# Patient Record
Sex: FEMALE | Race: WHITE | NOT HISPANIC OR LATINO | URBAN - METROPOLITAN AREA
[De-identification: names, ages, dates, MRNs, and addresses within clinical notes are randomized per-mention and may not be internally consistent; named-entity substitution may affect disease eponyms.]

---

## 2018-09-11 ENCOUNTER — NEW PATIENT (OUTPATIENT)
Dept: URBAN - METROPOLITAN AREA CLINIC 9 | Facility: CLINIC | Age: 23
End: 2018-09-11

## 2018-09-11 DIAGNOSIS — H52.13 MYOPIA, BILATERAL: Primary | ICD-10-CM

## 2018-09-11 ASSESSMENT — INTRAOCULAR PRESSURE
OD: 14
OS: 15

## 2018-09-11 ASSESSMENT — KERATOMETRY
OD: 42.70
OS: 42.85

## 2018-09-11 ASSESSMENT — VISUAL ACUITY
OD: 20/20
OS: 20/20

## 2022-08-23 SDOH — HEALTH STABILITY: PHYSICAL HEALTH: ON AVERAGE, HOW MANY MINUTES DO YOU ENGAGE IN EXERCISE AT THIS LEVEL?: 90 MIN

## 2022-08-23 SDOH — HEALTH STABILITY: PHYSICAL HEALTH: ON AVERAGE, HOW MANY DAYS PER WEEK DO YOU ENGAGE IN MODERATE TO STRENUOUS EXERCISE (LIKE A BRISK WALK)?: 5 DAYS

## 2022-08-23 ASSESSMENT — SOCIAL DETERMINANTS OF HEALTH (SDOH)
WITHIN THE LAST YEAR, HAVE YOU BEEN HUMILIATED OR EMOTIONALLY ABUSED IN OTHER WAYS BY YOUR PARTNER OR EX-PARTNER?: YES
WITHIN THE LAST YEAR, HAVE TO BEEN RAPED OR FORCED TO HAVE ANY KIND OF SEXUAL ACTIVITY BY YOUR PARTNER OR EX-PARTNER?: NO
WITHIN THE LAST YEAR, HAVE YOU BEEN KICKED, HIT, SLAPPED, OR OTHERWISE PHYSICALLY HURT BY YOUR PARTNER OR EX-PARTNER?: NO
WITHIN THE LAST YEAR, HAVE YOU BEEN AFRAID OF YOUR PARTNER OR EX-PARTNER?: NO

## 2022-08-24 ENCOUNTER — OFFICE VISIT (OUTPATIENT)
Dept: INTERNAL MEDICINE CLINIC | Age: 27
End: 2022-08-24
Payer: COMMERCIAL

## 2022-08-24 VITALS
RESPIRATION RATE: 14 BRPM | SYSTOLIC BLOOD PRESSURE: 110 MMHG | DIASTOLIC BLOOD PRESSURE: 78 MMHG | BODY MASS INDEX: 19.96 KG/M2 | HEART RATE: 102 BPM | HEIGHT: 66 IN | WEIGHT: 124.2 LBS | OXYGEN SATURATION: 100 %

## 2022-08-24 DIAGNOSIS — F90.9 ATTENTION DEFICIT HYPERACTIVITY DISORDER (ADHD), UNSPECIFIED ADHD TYPE: Primary | ICD-10-CM

## 2022-08-24 DIAGNOSIS — F41.9 ANXIETY: ICD-10-CM

## 2022-08-24 PROCEDURE — 99204 OFFICE O/P NEW MOD 45 MIN: CPT | Performed by: INTERNAL MEDICINE

## 2022-08-24 RX ORDER — NORETHINDRONE ACETATE AND ETHINYL ESTRADIOL AND FERROUS FUMARATE 1MG-20(21)
KIT ORAL
COMMUNITY
Start: 2022-07-06

## 2022-08-24 RX ORDER — DEXTROAMPHETAMINE SACCHARATE, AMPHETAMINE ASPARTATE MONOHYDRATE, DEXTROAMPHETAMINE SULFATE AND AMPHETAMINE SULFATE 7.5; 7.5; 7.5; 7.5 MG/1; MG/1; MG/1; MG/1
30 CAPSULE, EXTENDED RELEASE ORAL DAILY
COMMUNITY
Start: 2022-08-15 | End: 2022-10-21 | Stop reason: SDUPTHER

## 2022-08-24 ASSESSMENT — PATIENT HEALTH QUESTIONNAIRE - PHQ9
SUM OF ALL RESPONSES TO PHQ QUESTIONS 1-9: 0
SUM OF ALL RESPONSES TO PHQ QUESTIONS 1-9: 0
2. FEELING DOWN, DEPRESSED OR HOPELESS: 0
1. LITTLE INTEREST OR PLEASURE IN DOING THINGS: 0
SUM OF ALL RESPONSES TO PHQ QUESTIONS 1-9: 0
SUM OF ALL RESPONSES TO PHQ QUESTIONS 1-9: 0
SUM OF ALL RESPONSES TO PHQ9 QUESTIONS 1 & 2: 0
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
## 2022-08-25 ENCOUNTER — TELEPHONE (OUTPATIENT)
Dept: INTERNAL MEDICINE CLINIC | Age: 27
End: 2022-08-25

## 2022-08-25 NOTE — TELEPHONE ENCOUNTER
Patient currently sees a psychiatrist here locally who wants her to have ADHD testing next month. She is not really happy with this psychiatrist.  Would Dr. Francesco Gipson be willing  to meet with her to determine if they have a good rapport so she can forego having this testing done? Would he require her to have this  ADHD testing done? Please call  patient at number provided to discuss. Also patient has questions about recent test results that she would like to discuss as well with an MA/Dr. Ela Edwards. Recommended observation.

## 2022-08-25 NOTE — TELEPHONE ENCOUNTER
Please let patient know that all of her labs came back normal. It will be up to Dr. Nora Garcia as to whether or not he wants her to have formal ADHD testing. I do think she would have a good experience with Dr. Nora Garcia regardless.
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
## 2022-09-21 ENCOUNTER — OFFICE VISIT (OUTPATIENT)
Dept: PSYCHIATRY | Age: 27
End: 2022-09-21
Payer: COMMERCIAL

## 2022-09-21 VITALS
OXYGEN SATURATION: 96 % | SYSTOLIC BLOOD PRESSURE: 106 MMHG | BODY MASS INDEX: 19.85 KG/M2 | HEART RATE: 124 BPM | WEIGHT: 123 LBS | DIASTOLIC BLOOD PRESSURE: 62 MMHG

## 2022-09-21 DIAGNOSIS — F90.0 ADHD, PREDOMINANTLY INATTENTIVE TYPE: Primary | ICD-10-CM

## 2022-09-21 DIAGNOSIS — F51.05 INSOMNIA DUE TO MENTAL CONDITION: ICD-10-CM

## 2022-09-21 PROCEDURE — 99204 OFFICE O/P NEW MOD 45 MIN: CPT | Performed by: STUDENT IN AN ORGANIZED HEALTH CARE EDUCATION/TRAINING PROGRAM

## 2022-09-21 RX ORDER — HYDROXYZINE HYDROCHLORIDE 25 MG/1
25 TABLET, FILM COATED ORAL NIGHTLY PRN
Qty: 30 TABLET | Refills: 0 | Status: SHIPPED | OUTPATIENT
Start: 2022-09-21 | End: 2022-10-21

## 2022-09-21 ASSESSMENT — PATIENT HEALTH QUESTIONNAIRE - PHQ9
8. MOVING OR SPEAKING SO SLOWLY THAT OTHER PEOPLE COULD HAVE NOTICED. OR THE OPPOSITE, BEING SO FIGETY OR RESTLESS THAT YOU HAVE BEEN MOVING AROUND A LOT MORE THAN USUAL: 1
4. FEELING TIRED OR HAVING LITTLE ENERGY: 2
1. LITTLE INTEREST OR PLEASURE IN DOING THINGS: 0
6. FEELING BAD ABOUT YOURSELF - OR THAT YOU ARE A FAILURE OR HAVE LET YOURSELF OR YOUR FAMILY DOWN: 0
SUM OF ALL RESPONSES TO PHQ9 QUESTIONS 1 & 2: 0
SUM OF ALL RESPONSES TO PHQ QUESTIONS 1-9: 7
SUM OF ALL RESPONSES TO PHQ QUESTIONS 1-9: 7
10. IF YOU CHECKED OFF ANY PROBLEMS, HOW DIFFICULT HAVE THESE PROBLEMS MADE IT FOR YOU TO DO YOUR WORK, TAKE CARE OF THINGS AT HOME, OR GET ALONG WITH OTHER PEOPLE: 2
7. TROUBLE CONCENTRATING ON THINGS, SUCH AS READING THE NEWSPAPER OR WATCHING TELEVISION: 1
SUM OF ALL RESPONSES TO PHQ QUESTIONS 1-9: 7
3. TROUBLE FALLING OR STAYING ASLEEP: 3
2. FEELING DOWN, DEPRESSED OR HOPELESS: 0
SUM OF ALL RESPONSES TO PHQ QUESTIONS 1-9: 7
5. POOR APPETITE OR OVEREATING: 0
9. THOUGHTS THAT YOU WOULD BE BETTER OFF DEAD, OR OF HURTING YOURSELF: 0

## 2022-09-21 ASSESSMENT — ANXIETY QUESTIONNAIRES
IF YOU CHECKED OFF ANY PROBLEMS ON THIS QUESTIONNAIRE, HOW DIFFICULT HAVE THESE PROBLEMS MADE IT FOR YOU TO DO YOUR WORK, TAKE CARE OF THINGS AT HOME, OR GET ALONG WITH OTHER PEOPLE: SOMEWHAT DIFFICULT
6. BECOMING EASILY ANNOYED OR IRRITABLE: 1
4. TROUBLE RELAXING: 2
7. FEELING AFRAID AS IF SOMETHING AWFUL MIGHT HAPPEN: 0
1. FEELING NERVOUS, ANXIOUS, OR ON EDGE: 2
5. BEING SO RESTLESS THAT IT IS HARD TO SIT STILL: 1
3. WORRYING TOO MUCH ABOUT DIFFERENT THINGS: 2
2. NOT BEING ABLE TO STOP OR CONTROL WORRYING: 2
GAD7 TOTAL SCORE: 10

## 2022-10-18 ENCOUNTER — OFFICE VISIT (OUTPATIENT)
Dept: PSYCHOLOGY | Age: 27
End: 2022-10-18

## 2022-10-18 DIAGNOSIS — Z62.820 RELATIONSHIP PROBLEM WITH PARENT: ICD-10-CM

## 2022-10-18 DIAGNOSIS — F41.9 ANXIETY: Primary | ICD-10-CM

## 2022-10-18 PROCEDURE — 90791 PSYCH DIAGNOSTIC EVALUATION: CPT

## 2022-10-18 ASSESSMENT — ANXIETY QUESTIONNAIRES
6. BECOMING EASILY ANNOYED OR IRRITABLE: 0
GAD7 TOTAL SCORE: 5
5. BEING SO RESTLESS THAT IT IS HARD TO SIT STILL: 1
4. TROUBLE RELAXING: 1
IF YOU CHECKED OFF ANY PROBLEMS ON THIS QUESTIONNAIRE, HOW DIFFICULT HAVE THESE PROBLEMS MADE IT FOR YOU TO DO YOUR WORK, TAKE CARE OF THINGS AT HOME, OR GET ALONG WITH OTHER PEOPLE: SOMEWHAT DIFFICULT
7. FEELING AFRAID AS IF SOMETHING AWFUL MIGHT HAPPEN: 0
1. FEELING NERVOUS, ANXIOUS, OR ON EDGE: 1
2. NOT BEING ABLE TO STOP OR CONTROL WORRYING: 1
3. WORRYING TOO MUCH ABOUT DIFFERENT THINGS: 1

## 2022-10-18 ASSESSMENT — PATIENT HEALTH QUESTIONNAIRE - PHQ9
SUM OF ALL RESPONSES TO PHQ QUESTIONS 1-9: 4
3. TROUBLE FALLING OR STAYING ASLEEP: 1
8. MOVING OR SPEAKING SO SLOWLY THAT OTHER PEOPLE COULD HAVE NOTICED. OR THE OPPOSITE, BEING SO FIGETY OR RESTLESS THAT YOU HAVE BEEN MOVING AROUND A LOT MORE THAN USUAL: 1
6. FEELING BAD ABOUT YOURSELF - OR THAT YOU ARE A FAILURE OR HAVE LET YOURSELF OR YOUR FAMILY DOWN: 0
5. POOR APPETITE OR OVEREATING: 0
SUM OF ALL RESPONSES TO PHQ QUESTIONS 1-9: 4
1. LITTLE INTEREST OR PLEASURE IN DOING THINGS: 0
4. FEELING TIRED OR HAVING LITTLE ENERGY: 1
SUM OF ALL RESPONSES TO PHQ QUESTIONS 1-9: 4
10. IF YOU CHECKED OFF ANY PROBLEMS, HOW DIFFICULT HAVE THESE PROBLEMS MADE IT FOR YOU TO DO YOUR WORK, TAKE CARE OF THINGS AT HOME, OR GET ALONG WITH OTHER PEOPLE: 1
7. TROUBLE CONCENTRATING ON THINGS, SUCH AS READING THE NEWSPAPER OR WATCHING TELEVISION: 1
9. THOUGHTS THAT YOU WOULD BE BETTER OFF DEAD, OR OF HURTING YOURSELF: 0
SUM OF ALL RESPONSES TO PHQ QUESTIONS 1-9: 4
2. FEELING DOWN, DEPRESSED OR HOPELESS: 0
SUM OF ALL RESPONSES TO PHQ9 QUESTIONS 1 & 2: 0
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
## 2022-10-23 ASSESSMENT — ENCOUNTER SYMPTOMS
EYES NEGATIVE: 1
GASTROINTESTINAL NEGATIVE: 1
RESPIRATORY NEGATIVE: 1
ALLERGIC/IMMUNOLOGIC NEGATIVE: 1

## 2022-10-24 ENCOUNTER — OFFICE VISIT (OUTPATIENT)
Dept: PSYCHIATRY | Age: 27
End: 2022-10-24
Payer: COMMERCIAL

## 2022-10-24 VITALS
HEART RATE: 112 BPM | WEIGHT: 124.4 LBS | DIASTOLIC BLOOD PRESSURE: 70 MMHG | BODY MASS INDEX: 20.08 KG/M2 | OXYGEN SATURATION: 97 % | SYSTOLIC BLOOD PRESSURE: 110 MMHG

## 2022-10-24 DIAGNOSIS — F33.8 SEASONAL AFFECTIVE DISORDER (HCC): ICD-10-CM

## 2022-10-24 DIAGNOSIS — F90.0 ADHD, PREDOMINANTLY INATTENTIVE TYPE: Primary | ICD-10-CM

## 2022-10-24 PROCEDURE — 99214 OFFICE O/P EST MOD 30 MIN: CPT | Performed by: STUDENT IN AN ORGANIZED HEALTH CARE EDUCATION/TRAINING PROGRAM

## 2022-10-24 RX ORDER — BUPROPION HYDROCHLORIDE 150 MG/1
150 TABLET ORAL EVERY MORNING
Qty: 30 TABLET | Refills: 2 | Status: SHIPPED | OUTPATIENT
Start: 2022-10-24 | End: 2022-11-28 | Stop reason: SINTOL

## 2022-10-24 ASSESSMENT — PATIENT HEALTH QUESTIONNAIRE - PHQ9
SUM OF ALL RESPONSES TO PHQ QUESTIONS 1-9: 3
4. FEELING TIRED OR HAVING LITTLE ENERGY: 1
SUM OF ALL RESPONSES TO PHQ QUESTIONS 1-9: 3
10. IF YOU CHECKED OFF ANY PROBLEMS, HOW DIFFICULT HAVE THESE PROBLEMS MADE IT FOR YOU TO DO YOUR WORK, TAKE CARE OF THINGS AT HOME, OR GET ALONG WITH OTHER PEOPLE: 1
9. THOUGHTS THAT YOU WOULD BE BETTER OFF DEAD, OR OF HURTING YOURSELF: 0
7. TROUBLE CONCENTRATING ON THINGS, SUCH AS READING THE NEWSPAPER OR WATCHING TELEVISION: 1
6. FEELING BAD ABOUT YOURSELF - OR THAT YOU ARE A FAILURE OR HAVE LET YOURSELF OR YOUR FAMILY DOWN: 0
1. LITTLE INTEREST OR PLEASURE IN DOING THINGS: 0
5. POOR APPETITE OR OVEREATING: 0
8. MOVING OR SPEAKING SO SLOWLY THAT OTHER PEOPLE COULD HAVE NOTICED. OR THE OPPOSITE, BEING SO FIGETY OR RESTLESS THAT YOU HAVE BEEN MOVING AROUND A LOT MORE THAN USUAL: 1
2. FEELING DOWN, DEPRESSED OR HOPELESS: 0
3. TROUBLE FALLING OR STAYING ASLEEP: 0
SUM OF ALL RESPONSES TO PHQ9 QUESTIONS 1 & 2: 0

## 2022-10-24 ASSESSMENT — ENCOUNTER SYMPTOMS
EYES NEGATIVE: 1
RESPIRATORY NEGATIVE: 1
GASTROINTESTINAL NEGATIVE: 1
ALLERGIC/IMMUNOLOGIC NEGATIVE: 1

## 2022-10-24 ASSESSMENT — ANXIETY QUESTIONNAIRES
3. WORRYING TOO MUCH ABOUT DIFFERENT THINGS: 1
7. FEELING AFRAID AS IF SOMETHING AWFUL MIGHT HAPPEN: 0
5. BEING SO RESTLESS THAT IT IS HARD TO SIT STILL: 0
GAD7 TOTAL SCORE: 4
6. BECOMING EASILY ANNOYED OR IRRITABLE: 0
IF YOU CHECKED OFF ANY PROBLEMS ON THIS QUESTIONNAIRE, HOW DIFFICULT HAVE THESE PROBLEMS MADE IT FOR YOU TO DO YOUR WORK, TAKE CARE OF THINGS AT HOME, OR GET ALONG WITH OTHER PEOPLE: SOMEWHAT DIFFICULT
2. NOT BEING ABLE TO STOP OR CONTROL WORRYING: 1
1. FEELING NERVOUS, ANXIOUS, OR ON EDGE: 1
4. TROUBLE RELAXING: 1

## 2022-10-24 NOTE — PROGRESS NOTES
PSYCHIATRY PROGRESS NOTE    Devaughn Lantigua  1995  10/24/22  Face to Face time: 30 minutes  PCP: Richmond Lainez DO    CC:   Chief Complaint   Patient presents with    Follow-up    ADHD     Patient is a 44-year-old female without significant past medical history presents the outpatient psychiatric clinic today for evaluation management of depression and anxiety as well as ADHD concerns. A:  Patient's presentation today is indicative of continued success of her ADHD medications, though there is some concern for focus difficulties as well as other mood changes that have said and now that the weather has begun to change. This likely does lead to a diagnosis of a seasonal affective disorder, which would be congruent with the patient's stated history from the difference between when they were in [place] versus  [place]. We will plan to adjust the patient's medication regimen in order to provide her with improved support. Diagnosis:  ADHD, inattentive subtype predominant  Seasonal affective disorder    P:   1. We will plan to continue the patient's medication of Adderall XR 30 mg daily for treatment of ADHD. 2.  We will plan to add bupropion  mg daily for treatment of seasonal affect of disorder as well as to provide ADHD support. Patient was advised on potential side effects of this medication including headaches, increased blood pressure/heart rate, insomnia, appetite suppression, and increased suicidal ideations. Medication Monitoring:    - PDMP reviewed: Adderall XR 30 mg daily 30-day supply filled 9/21/2022. Follow-up: 4 weeks    Safety: Pt was counseled on the potential for increased suicidal ideations and advised on potential options for dealing with these including hotlines, calling the office, or going to the nearest emergency room.       __________________________________________________________________________    S:   Patient reported that she was doing \"okay\", finding that her work is slowed up a little bit and that that was helpful. That being said, the patient has noticed that as the weather has begun to change that she is experiencing a decrease in her moods that have also included a change in her ability to focus. The patient noted that she has difficulties bringing tasks to conclusion, finding that her to do is keeps getting longer and she has difficulties actually finishing things off of the list.  She noted that it was harder for her to get excited about the holidays this year because her family is more scattered and she is not in a place where she has a lot of social support. That said, she is still functional, getting up and going to work and trying to engage in work-related social activities. She does recognize that she could use a little bit of help in function. Patient denied any SI/HI/AVH on evaluation today. ROS:  Review of Systems   Constitutional: Negative. HENT: Negative. Eyes: Negative. Respiratory: Negative. Cardiovascular: Negative. Gastrointestinal: Negative. Endocrine: Negative. Genitourinary: Negative. Musculoskeletal: Negative. Skin: Negative. Allergic/Immunologic: Negative. Neurological: Negative. Hematological: Negative. Psychiatric/Behavioral:  Positive for decreased concentration. Brief Medical Hx:   Patient Active Problem List   Diagnosis    ADHD, predominantly inattentive type        Brief Psych Hx:  Hosp: Denied  Diagnoses: ADHD  Med trials: No prior medications other than Adderall  Outpt: Has had previous psychiatrist but not currently. Has been interested in counseling previously but is not currently engaged in counseling.   NSSI: Denied  Suicide Attempts: Denied    O:  Wt Readings from Last 3 Encounters:   10/24/22 124 lb 6.4 oz (56.4 kg)   09/21/22 123 lb (55.8 kg)   08/24/22 124 lb 3.2 oz (56.3 kg)       Vitals:    10/24/22 0835   BP: 110/70   Pulse: (!) 112   SpO2: 97%   Weight: 124 lb 6.4 oz (56.4 kg)       Mental Status Exam:   Appearance:    Appropriately dressed  Motor: No abnormal movements, tics or mannerisms. Eye Contact: Good  Speech:    Appropriate rate and rhythm, maybe slightly slower compared to previous  Language:   Appropriate diction  Mood/Affect:  \" I can feel it slowing down a little\"/some mild blunting apparent but generally full range of affect  Thought Process:   Linear, logical  Thought Content:    Some depressive content more present, no SI/HI  Hallucinations:   Denied, not seem to be responding to internal stimuli  Associations:   Intact  Attention/Concentration:   Intact  Orientation:    Alert and oriented x4  Memory:   Intact  Fund of Knowledge:    Appropriate for age and education  Insight/Judgement:   Intact/intact      JESUS ALBERTO-7 SCREENING 10/24/2022 10/18/2022   Feeling nervous, anxious, or on edge Several days Several days   Not being able to stop or control worrying Several days Several days   Worrying too much about different things Several days Several days   Trouble relaxing Several days Several days   Being so restless that it is hard to sit still Not at all Several days   Becoming easily annoyed or irritable Not at all Not at all   Feeling afraid as if something awful might happen Not at all Not at all   JESUS ALBERTO-7 Total Score 4 5   How difficult have these problems made it for you to do your work, take care of things at home, or get along with other people?  Somewhat difficult Somewhat difficult     PHQ-9 Questionaire 10/24/2022 10/18/2022   Little interest or pleasure in doing things 0 0   Feeling down, depressed, or hopeless 0 0   Trouble falling or staying asleep, or sleeping too much 0 1   Feeling tired or having little energy 1 1   Poor appetite or overeating 0 0   Feeling bad about yourself - or that you are a failure or have let yourself or your family down 0 0   Trouble concentrating on things, such as reading the newspaper or watching television 1 1   Moving or speaking so slowly that other people could have noticed. Or the opposite - being so fidgety or restless that you have been moving around a lot more than usual 1 1   Thoughts that you would be better off dead, or of hurting yourself in some way 0 0   PHQ-9 Total Score 3 4   If you checked off any problems, how difficult have these problems made it for you to do your work, take care of things at home, or get along with other people? 1 1      Labs:     Orders Only on 08/24/2022   Component Date Value Ref Range Status    TSH 08/24/2022 4.15  0.27 - 4.20 uIU/mL Final    Sodium 08/24/2022 138  136 - 145 mmol/L Final    Potassium 08/24/2022 3.8  3.5 - 5.1 mmol/L Final    Chloride 08/24/2022 102  99 - 110 mmol/L Final    CO2 08/24/2022 25  21 - 32 mmol/L Final    Anion Gap 08/24/2022 11  3 - 16 Final    Glucose 08/24/2022 86  70 - 99 mg/dL Final    BUN 08/24/2022 12  7 - 20 mg/dL Final    Creatinine 08/24/2022 1.0  0.6 - 1.1 mg/dL Final    GFR Non- 08/24/2022 >60  >60 Final    Comment: >60 mL/min/1.73m2 EGFR, calc. for ages 25 and older using the  MDRD formula (not corrected for weight), is valid for stable  renal function. GFR  08/24/2022 >60  >60 Final    Comment: Chronic Kidney Disease: less than 60 ml/min/1.73 sq.m. Kidney Failure: less than 15 ml/min/1.73 sq.m. Results valid for patients 18 years and older.       Calcium 08/24/2022 9.8  8.3 - 10.6 mg/dL Final    Total Protein 08/24/2022 7.0  6.4 - 8.2 g/dL Final    Albumin 08/24/2022 4.6  3.4 - 5.0 g/dL Final    Albumin/Globulin Ratio 08/24/2022 1.9  1.1 - 2.2 Final    Total Bilirubin 08/24/2022 0.4  0.0 - 1.0 mg/dL Final    Alkaline Phosphatase 08/24/2022 58  40 - 129 U/L Final    ALT 08/24/2022 16  10 - 40 U/L Final    AST 08/24/2022 17  15 - 37 U/L Final       EKG: None on file        Maame Cordova MD  Psychiatrist

## 2022-11-08 ENCOUNTER — OFFICE VISIT (OUTPATIENT)
Dept: PSYCHOLOGY | Age: 27
End: 2022-11-08
Payer: COMMERCIAL

## 2022-11-08 DIAGNOSIS — F41.9 ANXIETY: Primary | ICD-10-CM

## 2022-11-08 DIAGNOSIS — F33.8 SEASONAL AFFECTIVE DISORDER (HCC): ICD-10-CM

## 2022-11-08 PROCEDURE — 90832 PSYTX W PT 30 MINUTES: CPT

## 2022-11-08 ASSESSMENT — PATIENT HEALTH QUESTIONNAIRE - PHQ9
7. TROUBLE CONCENTRATING ON THINGS, SUCH AS READING THE NEWSPAPER OR WATCHING TELEVISION: 0
SUM OF ALL RESPONSES TO PHQ QUESTIONS 1-9: 6
9. THOUGHTS THAT YOU WOULD BE BETTER OFF DEAD, OR OF HURTING YOURSELF: 0
4. FEELING TIRED OR HAVING LITTLE ENERGY: 2
5. POOR APPETITE OR OVEREATING: 0
SUM OF ALL RESPONSES TO PHQ QUESTIONS 1-9: 6
1. LITTLE INTEREST OR PLEASURE IN DOING THINGS: 1
SUM OF ALL RESPONSES TO PHQ9 QUESTIONS 1 & 2: 2
SUM OF ALL RESPONSES TO PHQ QUESTIONS 1-9: 6
6. FEELING BAD ABOUT YOURSELF - OR THAT YOU ARE A FAILURE OR HAVE LET YOURSELF OR YOUR FAMILY DOWN: 0
SUM OF ALL RESPONSES TO PHQ QUESTIONS 1-9: 6
2. FEELING DOWN, DEPRESSED OR HOPELESS: 1
10. IF YOU CHECKED OFF ANY PROBLEMS, HOW DIFFICULT HAVE THESE PROBLEMS MADE IT FOR YOU TO DO YOUR WORK, TAKE CARE OF THINGS AT HOME, OR GET ALONG WITH OTHER PEOPLE: 1
3. TROUBLE FALLING OR STAYING ASLEEP: 2
8. MOVING OR SPEAKING SO SLOWLY THAT OTHER PEOPLE COULD HAVE NOTICED. OR THE OPPOSITE, BEING SO FIGETY OR RESTLESS THAT YOU HAVE BEEN MOVING AROUND A LOT MORE THAN USUAL: 0

## 2022-11-08 ASSESSMENT — ANXIETY QUESTIONNAIRES
4. TROUBLE RELAXING: 1
3. WORRYING TOO MUCH ABOUT DIFFERENT THINGS: 1
2. NOT BEING ABLE TO STOP OR CONTROL WORRYING: 1
IF YOU CHECKED OFF ANY PROBLEMS ON THIS QUESTIONNAIRE, HOW DIFFICULT HAVE THESE PROBLEMS MADE IT FOR YOU TO DO YOUR WORK, TAKE CARE OF THINGS AT HOME, OR GET ALONG WITH OTHER PEOPLE: SOMEWHAT DIFFICULT
GAD7 TOTAL SCORE: 4
7. FEELING AFRAID AS IF SOMETHING AWFUL MIGHT HAPPEN: 0
6. BECOMING EASILY ANNOYED OR IRRITABLE: 0
5. BEING SO RESTLESS THAT IT IS HARD TO SIT STILL: 0
1. FEELING NERVOUS, ANXIOUS, OR ON EDGE: 1
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
## 2022-11-09 NOTE — PATIENT INSTRUCTIONS
Return to see Dr. Nicho Solomon in 3-4 weeks. Begin setting schedule for behavioral activation in preparation for seasonal changes. Leisure Activities Catalogue: The following is a list of activities that might be fun and pleasurable for you. Feel free to add your own fun activities to the list.    1.  Soaking in the bathtub  2. Planning my career  3. Collecting things (coins, shells, etc.)  4. Going on a vacation  5. Recycling old items  6. Relaxing  7. Going on a date  6. Going to a movie  9.  Jogging, walking  10. Listening to music  11. Thinking I have done a full day's work  15. Recalling past parties  15. Buying household gadgets  14. Lying in the sun  15. Planning a career change  16. Laughing  17. Thinking about my past trips  18. Listening to others  23. Reading magazines or newspapers  20. Hobbies (stamp collecting, model building, etc.)  21. Spending an evening with good friends  25. Planning a day's activities  21. Meeting new people  24. Remembering beautiful scenery   22. Saving money  26. Card and board games  27. Going to the gym, doing aerobics  28. Eating  29. Thinking how it will be when I finish school  30. Getting out of debt/paying debts  31. Practicing karate, judo, yoga  32. Thinking about [facility]  35. Playing with Legos  34. Working on my car (bicycle)  35. Remembering the words and deeds of loving people  39. Wearing sexy clothes  37. Having quiet evenings  38. Taking care of my plants  39. Buying, selling stocks and shares  40. Going swimming  41. Doodling, coloring  42. Exercising  43. Collecting old things  40. Going to a party  45. Thinking about buying things  46. Playing golf  47. Playing soccer  48. Flying kites  49. Having discussions with friends  48. Having family get-togethers  46. Riding a motor bike or motorcycle  52. Sex  48. Playing or learning a musical instrument  54. Going camping  55. Singing around the house  64. Arranging flowers  57. Going to Religious, praying (practicing Jainism)  62. Losing weight   59. Going to the beach  60. Thinking I am an OK person  64. A day with nothing to do  62. Having class reunions  61. Going ice skating, roller skating  64. Going sailing  65. Traveling abroad, interstate, or within the state  77. Sketching, painting  79. Doing something spontaneously  68. Doing embroidery, cross stitching  69. Sleeping  70. Driving  71. Entertaining  72. Going to clubs (garden, selling, etc.)  68. Thinking about getting   76. Going bird watching  75. Singing with groups  76. Flirting  77. Playing musical instruments  78. Doing arts and crafts  78. Making a gift for someone  [de-identified]. Buying CDs, tapes, records  81. Watching boxing, wrestling  82. Planning parties  80. Cooking, baking  84. Going hiking, walking  85. Writing books (pollens, articles)  80. Sewing  87. Buying close  88. Working  89. Going out to dinner  90. Discussing box  91. Sight seeing  80. Gardening  93. Getting a manicure/pedicure  94. Early morning coffee and newspaper  95. Playing tennis  96. Kissing  97. Watching my children play  98. Going to plays and concerts  99. Daydreaming  100. Planning to go to school  101. Thinking about sex  80. Going for a drive  372. Listening to the stereo  104. Refurbishing furniture  105. Watching TV, videos  106. Making lists of tasks  107. Going bike riding  108. Walks on the river[place]  109. Buying gifts  110. Traveling to [place]  111. Completing a task  112. Thinking about my achievements  113. Going to a sports game  114. Eating delicious food  6:99. Exchanging emails, chatting on the Internet  116. Photography  117. Going fishing  118. Thinking about pleasant events  119. Staying on a diet  120. Start gazing  121. Flying a plane  122. Reading fiction  123. Acting  124. Being alone  125.   Writing a diary, journal entry or letter  126. Cleaning  127. Reading non-fiction  128. Taking children places  129. Dancing  1:30. Going on a picnic  131. Thinking \"I did that pretty well\" after doing something  132. Meditating  133. Playing volleyball  134. Having lunch with a friend  135. Going to the hills  136. Thinking about having a family  80. Thoughts about happy moments in my childhood  138. Splurging  139. Playing cards  140. Solving riddles mentally  141. Having a political discussion  142. Exploring a new area of town  143. Seeing and/or showing photos or slides  144. Knitting/crocheting/quilting  145. Doing crossword puzzles  146. Shooting pool / playing billiards  147. Dressing up and looking nice  148. Reflecting on how I've improved  149. Buying things for myself  150. Talking on the phone  151. Going to museums, [employer]  152. Thinking Restorationist thoughts  153. Surfing the Internet  154. Lighting candles  155. Listening to the radio  156. Watching DEVON Talks  157. Having coffee at a café  158. Listening to the radio  159. Getting/giving a massage  160. Saying \"I love you\"  161. Thinking about my good qualities  162. Buying books  163. Taking a sauna or steam bath  164. Going skiing  165. Going canoeing or white-water rafting  166. Going bowling  167. Doing woodworking  168. Fantasizing about the future  169. Doing ballet, jazz, tap dancing  170. Debating  171. Playing computer games  172. Having an aquarium  173. Erotica (sex books, movies)  80. Going horseback riding  175. Going rockclimbing  176. Thinking about becoming active in the community  177. Doing something new  178. Making jigsaw puzzles  179. Thinking I'm a person who can cope  180. Playing with my pets  181. Having a barbecue  182. Rearranging the furniture in my house  183. Buying new furniture  184. Going windowshopping  185. Thinking I have a lot more going for me than most people  186.   Gardening

## 2022-11-21 ENCOUNTER — OFFICE VISIT (OUTPATIENT)
Dept: INTERNAL MEDICINE CLINIC | Age: 27
End: 2022-11-21
Payer: COMMERCIAL

## 2022-11-21 ENCOUNTER — TELEPHONE (OUTPATIENT)
Dept: PSYCHIATRY | Age: 27
End: 2022-11-21

## 2022-11-21 VITALS
OXYGEN SATURATION: 96 % | SYSTOLIC BLOOD PRESSURE: 102 MMHG | WEIGHT: 126.2 LBS | HEIGHT: 66 IN | HEART RATE: 97 BPM | BODY MASS INDEX: 20.28 KG/M2 | DIASTOLIC BLOOD PRESSURE: 58 MMHG

## 2022-11-21 DIAGNOSIS — Z00.00 ANNUAL PHYSICAL EXAM: Primary | ICD-10-CM

## 2022-11-21 PROCEDURE — 99395 PREV VISIT EST AGE 18-39: CPT | Performed by: INTERNAL MEDICINE

## 2022-11-21 RX ORDER — NORETHINDRONE ACETATE AND ETHINYL ESTRADIOL AND FERROUS FUMARATE 1MG-20(21)
1 KIT ORAL DAILY
Qty: 3 PACKET | Refills: 2 | Status: SHIPPED | OUTPATIENT
Start: 2022-11-21

## 2022-11-21 ASSESSMENT — PATIENT HEALTH QUESTIONNAIRE - PHQ9
SUM OF ALL RESPONSES TO PHQ9 QUESTIONS 1 & 2: 0
2. FEELING DOWN, DEPRESSED OR HOPELESS: 0
SUM OF ALL RESPONSES TO PHQ QUESTIONS 1-9: 0
1. LITTLE INTEREST OR PLEASURE IN DOING THINGS: 0

## 2022-11-21 NOTE — PROGRESS NOTES
James Chase (:  1995) is a 32 y.o. female,Established patient, here for evaluation of the following chief complaint(s): Annual Exam      ASSESSMENT/PLAN:  1. Annual physical exam  Seat belt use:yes  Smoke and carbon monoxide detectors within the home: yes  Jake Star screen use: no, education given. Diet: feels overall she does well. Veggies and fruits daily. Protein eats beef, chicken, seafood. Fried foods about 2-3 x per week. Sugary drinks 2 x per week. Exercise: 5 x per week weight lifting. 30-60 min walking dog. Education given   Fire arms: no  Dental exam: yes  Eye exam: no, education     Influenza vaccine: declined  Covid-19 booster: education given   Tetanus: unclear last kind she had consider giving tdap, patient to find out. PAP: last in . Never had an abnormal Referrals given   HIV and Hep C: education given, will plan with next lab work  Return in about 1 year (around 2023) for Annual .    SUBJECTIVE/OBJECTIVE:  HPI      Annual physical     Seat belt use:yes  Smoke and carbon monoxide detectors within the home: yes  Jake Star screen use: no, education given. Diet: feels overall she does well. Veggies and fruits daily. Protein eats beef, chicken, seafood. Fried foods about 2-3 x per week. Sugary drinks 2 x per week. Exercise: 5 x per week weight lifting. 30-60 min walking dog. Education given   Fire arms: no  Dental exam: yes  Eye exam: no, education     Influenza vaccine: declined  Covid-19 booster: education given   Tetanus: unclear last kind she had consider given tdap, patient to find out. PAP: last in 2017. Never had an abnormal Referrals given   HIV and Hep C: education given    Patient overall feeling well without any other concerns or questions. Patient has seen Dr. Warden Broussard with psychiatry. She feels that she is getting what she needs through his appointments. Patient denies any side effects with her current medications and doses.     Review of Systems ROS negative except for those noted in the HPI above. Vitals:    11/21/22 0900 11/21/22 0933   BP: (!) 102/58    Site: Right Upper Arm    Position: Sitting    Cuff Size: Small Adult    Pulse: (!) 109 97   SpO2: 96%    Weight: 126 lb 3.2 oz (57.2 kg)    Height: 5' 6\" (1.676 m)      Physical Exam  Constitutional:       Appearance: Normal appearance. HENT:      Head: Normocephalic and atraumatic. Right Ear: External ear normal.      Left Ear: External ear normal.   Eyes:      Extraocular Movements: Extraocular movements intact. Conjunctiva/sclera: Conjunctivae normal.   Cardiovascular:      Rate and Rhythm: Normal rate and regular rhythm. Heart sounds: Normal heart sounds. No murmur heard. No friction rub. No gallop. Pulmonary:      Effort: Pulmonary effort is normal. No respiratory distress. Breath sounds: Normal breath sounds. No wheezing, rhonchi or rales. Abdominal:      General: Bowel sounds are normal. There is no distension. Palpations: Abdomen is soft. Tenderness: There is no abdominal tenderness. There is no guarding or rebound. Musculoskeletal:      Right lower leg: No edema. Left lower leg: No edema. Skin:     General: Skin is warm. Findings: No erythema or rash. Neurological:      General: No focal deficit present. Mental Status: She is alert and oriented to person, place, and time. Psychiatric:         Mood and Affect: Mood normal.         Behavior: Behavior normal.         An electronic signature was used to authenticate this note.     --Dulce Vargas, DO

## 2022-11-21 NOTE — TELEPHONE ENCOUNTER
Patient is requesting a re-fill on her Adderall (Generic) 30mg ER be sent to [facility] on [address].

## 2022-11-21 NOTE — PATIENT INSTRUCTIONS
For Women and [address] [facility] and Dr. Stephanie Hernandez (Psychotherapy or Psychiatry)   [facility])  Accepts most insurances  Many locations  [phone]  [email or web address]  Recommend scheduling online because telephone can have long wait times    Private Insurance/Self-pay                Leigh Stephens. [employer] and medicare  [address] [facility], [address] [facility]  [phone]  [email or web address]    [facility]  Two locations  [phone]  Verto AnalyticsContenLUXA. [employer] Railroad    [facility]ult [facility] of [address]  [address] [facility], [address], [address]  [phone]  [email or web address]    Counseling [place]       [email or web address]. [address]       [phone]  [address] [facility], [address]  100-155 per session  Individual, couples, and group counseling  Do not bill insurance, but can provide you with bills that you can submit to your insurance to try to obtain reimbursement     [facility] [address] and [address] [facility]  [phone]  [email or web address]  [email or web address]    [facility]  Mainly self-pay, but will at times offer services for those with financial limitations  [address], [facility], . [address]  [address], [place], [address]  [email or web address]  "[email or web address]. com    [facility]   Appears to be mainly self-pay, but may call to see if your insurance is accepted  [address], [address], [address]  [phone]  [email or web address]. [address] Railroad/    [address]  They do not bill insurance, but will provide you with a receipt for you to try to get reimbursed  Considered out-of-network providers  [address] [facility], [address] [facility]  Phone: [phone]  Elucid BioimagingCorewell Health Big Rapids HospitalTequila Mobile.Tal[employer] Therapeutics/    [facility]  Accept many private insurance plans  Robert, [place], and [employer]  [phone]  www. [email or web address]    Y[employer] Inc many insurances and also offers self-pay discounts  Many different locations across [address] Jacklyn Rashiddoug Lian  www. [email or web address] net    [address] [facility]  Insurance/payment not mentioned on website  [address], [place], [place], [address]  [facility]. [facility]  Insurance/payment not mentioned on website  [address] [place] [address]  [phone]  [address] [place] [address]. [facility]    [facility]  Accepts many insurances  [address] [facility]  [phone]  [email or web address]    [facility]   Will provide you with a bill that you can submit to insurance to try to get reimbursed  Likely will be out-of-network provider  [address] [facility] [place], Khadar Melvin, [email or web address]    [facility]  Appears to have a [place] affiliation  [address]ii CELINA [address] [place] [address] location as well  [phone]  Bayes ImpactVanderbilt Children's Hospitalcenters. com    [employer]  Accepts many private insurances and Medicare  [address]  [phone] ext. [address] Sharda  Accepts many private insurances  [address]  [phone]    Monse Marin Medicare and other private insurances  [address], [address]  [phone]  [email or web address]    Calvin Shell  Accepts medicare and many private insurances  [address]  [phone]    NoInsuranceAgent.. [email or web address]  Can utilize this website and filter by location and insurance
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
## 2022-11-28 ENCOUNTER — TELEMEDICINE (OUTPATIENT)
Dept: PSYCHIATRY | Age: 27
End: 2022-11-28

## 2022-11-28 DIAGNOSIS — F90.0 ADHD, PREDOMINANTLY INATTENTIVE TYPE: ICD-10-CM

## 2022-11-28 RX ORDER — DEXTROAMPHETAMINE SACCHARATE, AMPHETAMINE ASPARTATE MONOHYDRATE, DEXTROAMPHETAMINE SULFATE AND AMPHETAMINE SULFATE 7.5; 7.5; 7.5; 7.5 MG/1; MG/1; MG/1; MG/1
30 CAPSULE, EXTENDED RELEASE ORAL DAILY
Qty: 30 CAPSULE | Refills: 0 | Status: SHIPPED | OUTPATIENT
Start: 2022-11-28 | End: 2022-12-28

## 2022-11-28 RX ORDER — DEXTROAMPHETAMINE SACCHARATE, AMPHETAMINE ASPARTATE MONOHYDRATE, DEXTROAMPHETAMINE SULFATE AND AMPHETAMINE SULFATE 7.5; 7.5; 7.5; 7.5 MG/1; MG/1; MG/1; MG/1
30 CAPSULE, EXTENDED RELEASE ORAL DAILY
Qty: 30 CAPSULE | Refills: 0 | Status: SHIPPED | OUTPATIENT
Start: 2022-12-26 | End: 2023-01-25

## 2022-11-28 ASSESSMENT — ANXIETY QUESTIONNAIRES
IF YOU CHECKED OFF ANY PROBLEMS ON THIS QUESTIONNAIRE, HOW DIFFICULT HAVE THESE PROBLEMS MADE IT FOR YOU TO DO YOUR WORK, TAKE CARE OF THINGS AT HOME, OR GET ALONG WITH OTHER PEOPLE: SOMEWHAT DIFFICULT
2. NOT BEING ABLE TO STOP OR CONTROL WORRYING: 1
7. FEELING AFRAID AS IF SOMETHING AWFUL MIGHT HAPPEN: 0
5. BEING SO RESTLESS THAT IT IS HARD TO SIT STILL: 0
4. TROUBLE RELAXING: 1
GAD7 TOTAL SCORE: 4
1. FEELING NERVOUS, ANXIOUS, OR ON EDGE: 1
3. WORRYING TOO MUCH ABOUT DIFFERENT THINGS: 1
6. BECOMING EASILY ANNOYED OR IRRITABLE: 0

## 2022-11-28 ASSESSMENT — PATIENT HEALTH QUESTIONNAIRE - PHQ9
9. THOUGHTS THAT YOU WOULD BE BETTER OFF DEAD, OR OF HURTING YOURSELF: 0
SUM OF ALL RESPONSES TO PHQ QUESTIONS 1-9: 3
2. FEELING DOWN, DEPRESSED OR HOPELESS: 0
6. FEELING BAD ABOUT YOURSELF - OR THAT YOU ARE A FAILURE OR HAVE LET YOURSELF OR YOUR FAMILY DOWN: 0
SUM OF ALL RESPONSES TO PHQ9 QUESTIONS 1 & 2: 0
4. FEELING TIRED OR HAVING LITTLE ENERGY: 1
3. TROUBLE FALLING OR STAYING ASLEEP: 1
8. MOVING OR SPEAKING SO SLOWLY THAT OTHER PEOPLE COULD HAVE NOTICED. OR THE OPPOSITE, BEING SO FIGETY OR RESTLESS THAT YOU HAVE BEEN MOVING AROUND A LOT MORE THAN USUAL: 0
5. POOR APPETITE OR OVEREATING: 0
10. IF YOU CHECKED OFF ANY PROBLEMS, HOW DIFFICULT HAVE THESE PROBLEMS MADE IT FOR YOU TO DO YOUR WORK, TAKE CARE OF THINGS AT HOME, OR GET ALONG WITH OTHER PEOPLE: 1
SUM OF ALL RESPONSES TO PHQ QUESTIONS 1-9: 3
1. LITTLE INTEREST OR PLEASURE IN DOING THINGS: 0
7. TROUBLE CONCENTRATING ON THINGS, SUCH AS READING THE NEWSPAPER OR WATCHING TELEVISION: 1

## 2022-11-28 ASSESSMENT — ENCOUNTER SYMPTOMS
COUGH: 1
GASTROINTESTINAL NEGATIVE: 1
ALLERGIC/IMMUNOLOGIC NEGATIVE: 1
EYES NEGATIVE: 1

## 2023-02-02 ENCOUNTER — TELEPHONE (OUTPATIENT)
Dept: PSYCHIATRY | Age: 28
End: 2023-02-02

## 2023-02-02 ENCOUNTER — TELEPHONE (OUTPATIENT)
Dept: INTERNAL MEDICINE CLINIC | Age: 28
End: 2023-02-02

## 2023-02-02 ENCOUNTER — OFFICE VISIT (OUTPATIENT)
Dept: INTERNAL MEDICINE CLINIC | Age: 28
End: 2023-02-02
Payer: COMMERCIAL

## 2023-02-02 VITALS
OXYGEN SATURATION: 98 % | TEMPERATURE: 97 F | HEART RATE: 100 BPM | BODY MASS INDEX: 20.56 KG/M2 | SYSTOLIC BLOOD PRESSURE: 124 MMHG | DIASTOLIC BLOOD PRESSURE: 82 MMHG | WEIGHT: 127.4 LBS

## 2023-02-02 DIAGNOSIS — L21.9 SEBORRHEIC DERMATITIS: Primary | ICD-10-CM

## 2023-02-02 DIAGNOSIS — F90.0 ADHD, PREDOMINANTLY INATTENTIVE TYPE: ICD-10-CM

## 2023-02-02 PROCEDURE — 99213 OFFICE O/P EST LOW 20 MIN: CPT | Performed by: INTERNAL MEDICINE

## 2023-02-02 SDOH — ECONOMIC STABILITY: FOOD INSECURITY: WITHIN THE PAST 12 MONTHS, THE FOOD YOU BOUGHT JUST DIDN'T LAST AND YOU DIDN'T HAVE MONEY TO GET MORE.: NEVER TRUE

## 2023-02-02 SDOH — ECONOMIC STABILITY: INCOME INSECURITY: HOW HARD IS IT FOR YOU TO PAY FOR THE VERY BASICS LIKE FOOD, HOUSING, MEDICAL CARE, AND HEATING?: NOT HARD AT ALL

## 2023-02-02 SDOH — ECONOMIC STABILITY: FOOD INSECURITY: WITHIN THE PAST 12 MONTHS, YOU WORRIED THAT YOUR FOOD WOULD RUN OUT BEFORE YOU GOT MONEY TO BUY MORE.: NEVER TRUE

## 2023-02-02 SDOH — ECONOMIC STABILITY: HOUSING INSECURITY
IN THE LAST 12 MONTHS, WAS THERE A TIME WHEN YOU DID NOT HAVE A STEADY PLACE TO SLEEP OR SLEPT IN A SHELTER (INCLUDING NOW)?: NO

## 2023-02-02 ASSESSMENT — PATIENT HEALTH QUESTIONNAIRE - PHQ9
2. FEELING DOWN, DEPRESSED OR HOPELESS: 0
3. TROUBLE FALLING OR STAYING ASLEEP: 0
8. MOVING OR SPEAKING SO SLOWLY THAT OTHER PEOPLE COULD HAVE NOTICED. OR THE OPPOSITE, BEING SO FIGETY OR RESTLESS THAT YOU HAVE BEEN MOVING AROUND A LOT MORE THAN USUAL: 0
SUM OF ALL RESPONSES TO PHQ QUESTIONS 1-9: 0
SUM OF ALL RESPONSES TO PHQ QUESTIONS 1-9: 0
4. FEELING TIRED OR HAVING LITTLE ENERGY: 0
9. THOUGHTS THAT YOU WOULD BE BETTER OFF DEAD, OR OF HURTING YOURSELF: 0
SUM OF ALL RESPONSES TO PHQ9 QUESTIONS 1 & 2: 0
1. LITTLE INTEREST OR PLEASURE IN DOING THINGS: 0
SUM OF ALL RESPONSES TO PHQ QUESTIONS 1-9: 0
5. POOR APPETITE OR OVEREATING: 0
6. FEELING BAD ABOUT YOURSELF - OR THAT YOU ARE A FAILURE OR HAVE LET YOURSELF OR YOUR FAMILY DOWN: 0
10. IF YOU CHECKED OFF ANY PROBLEMS, HOW DIFFICULT HAVE THESE PROBLEMS MADE IT FOR YOU TO DO YOUR WORK, TAKE CARE OF THINGS AT HOME, OR GET ALONG WITH OTHER PEOPLE: 0
7. TROUBLE CONCENTRATING ON THINGS, SUCH AS READING THE NEWSPAPER OR WATCHING TELEVISION: 0
SUM OF ALL RESPONSES TO PHQ QUESTIONS 1-9: 0

## 2023-02-02 NOTE — TELEPHONE ENCOUNTER
Patient is currently working to get some issues resolved with [employer] regarding some past appointments and she is wanting to see if she can get a re-fill on her Adderall  XR 30 mg sent to [facility] on file. She is holding off scheduling until insurance issue gets rectified-codes had to be changed because was coded as out of network in error. Any questions/concerns please call her at number provided.

## 2023-02-02 NOTE — TELEPHONE ENCOUNTER
Medication:   Requested Prescriptions   amphetamine-dextroamphetamine (ADDERALL XR) 30 MG extended release capsule      No prescriptions requested or ordered in this encounter        Last Filled:12/12/22      Patient Phone Number: [phone] (home)     Last appt: 11/28/2022   Next appt: Visit date not found    Last OARRS: No flowsheet data found.

## 2023-02-02 NOTE — TELEPHONE ENCOUNTER
Patient has rash on face for more than a month. Patient scheduled for a same day appt with Dr. Jason Aragon at 12:45 today. Rash    How long? More than a month  Site? face  Flat or elevated? bumpy  Spreading?  no  Fever? no  Itching or pain? No   New medication, soap, laundry soap? She started a new face product for dry skin that she has discontinued and she still has the rash.        ----- Message from Roxana Maier sent at 2/1/2023  4:48 PM EST -----  Subject: Appointment Request    Reason for Call: Established Patient Appointment needed: Semi-Routine Skin Problem    QUESTIONS    Reason for appointment request? Available appointments did not meet   patient need     Additional Information for Provider?  Patient would like to be seen for   dermatitis asap  ---------------------------------------------------------------------------  --------------  [id number]  [phone]; OK to leave message on voicemail  ---------------------------------------------------------------------------  --------------  SCRIPT ANSWERS  TONY Screen: Chris Wright

## 2023-02-02 NOTE — PROGRESS NOTES
Jose Best (:  1995) is a 32 y.o. female,Established patient, here for evaluation of the following chief complaint(s):  Rash    ASSESSMENT/PLAN:  1. Seborrheic dermatitis  Symptoms likely secondary to seborrheic dermatitis however cannot rule out other possibilities. Would like patient to try over-the-counter cortisone over the next 1 week. Patient to notify me with how symptoms are in 1 week after using this. Referral given to dermatology  -     Danuta Roca MD, Dermatology, [facility]    Return in about 4 weeks (around 3/2/2023). SUBJECTIVE/OBJECTIVE:  HPI    Patient is a 55-year-old female with past medical history of ADHD and allergies who presents secondary to rash on her face. Patient notes that she was sick in November. Following that she had an irritated nose from using tissues constantly. She notes following she got better however she continues to have rash around her upper lip and chin. She notes that there are a lot of flakes especially in the morning. She tries to stay hydrated and has tried different face washes without resolution. Review of Systems ROS negative except for those noted in the HPI above. Vitals:    23 1245   BP: 124/82   Pulse: 100   Temp: 97 °F (36.1 °C)   SpO2: 98%     Physical Exam  Constitutional:       General: She is not in acute distress. Appearance: Normal appearance. She is not ill-appearing. HENT:      Head: Normocephalic and atraumatic. Right Ear: External ear normal.      Left Ear: External ear normal.   Eyes:      Extraocular Movements: Extraocular movements intact. Conjunctiva/sclera: Conjunctivae normal.   Cardiovascular:      Rate and Rhythm: Normal rate and regular rhythm. Heart sounds: No murmur heard. No friction rub. No gallop. Pulmonary:      Effort: Pulmonary effort is normal. No respiratory distress. Breath sounds: Normal breath sounds. No wheezing, rhonchi or rales.    Skin: Findings: Erythema (mild erythema around patients mouth, chin and upper lip) present. Comments: No flaking noted on exam however in picture showed this was present   Neurological:      General: No focal deficit present. Mental Status: She is alert and oriented to person, place, and time. Psychiatric:         Mood and Affect: Mood normal.         Behavior: Behavior normal.       An electronic signature was used to authenticate this note.     --Chino Coreas, DO

## 2023-02-07 RX ORDER — DEXTROAMPHETAMINE SACCHARATE, AMPHETAMINE ASPARTATE MONOHYDRATE, DEXTROAMPHETAMINE SULFATE AND AMPHETAMINE SULFATE 7.5; 7.5; 7.5; 7.5 MG/1; MG/1; MG/1; MG/1
30 CAPSULE, EXTENDED RELEASE ORAL DAILY
Qty: 30 CAPSULE | Refills: 0 | Status: SHIPPED | OUTPATIENT
Start: 2023-02-07 | End: 2023-03-09

## 2023-03-13 ENCOUNTER — OFFICE VISIT (OUTPATIENT)
Dept: PSYCHIATRY | Age: 28
End: 2023-03-13
Payer: COMMERCIAL

## 2023-03-13 VITALS
DIASTOLIC BLOOD PRESSURE: 70 MMHG | BODY MASS INDEX: 20.34 KG/M2 | OXYGEN SATURATION: 92 % | WEIGHT: 126 LBS | SYSTOLIC BLOOD PRESSURE: 118 MMHG | HEART RATE: 92 BPM

## 2023-03-13 DIAGNOSIS — F33.8 SEASONAL AFFECTIVE DISORDER (HCC): Primary | ICD-10-CM

## 2023-03-13 DIAGNOSIS — F90.0 ADHD, PREDOMINANTLY INATTENTIVE TYPE: ICD-10-CM

## 2023-03-13 PROCEDURE — 99214 OFFICE O/P EST MOD 30 MIN: CPT | Performed by: STUDENT IN AN ORGANIZED HEALTH CARE EDUCATION/TRAINING PROGRAM

## 2023-03-13 RX ORDER — CITALOPRAM 10 MG/1
10 TABLET ORAL DAILY
Qty: 30 TABLET | Refills: 3 | Status: SHIPPED | OUTPATIENT
Start: 2023-03-13

## 2023-03-13 RX ORDER — DEXTROAMPHETAMINE SACCHARATE, AMPHETAMINE ASPARTATE MONOHYDRATE, DEXTROAMPHETAMINE SULFATE AND AMPHETAMINE SULFATE 7.5; 7.5; 7.5; 7.5 MG/1; MG/1; MG/1; MG/1
30 CAPSULE, EXTENDED RELEASE ORAL DAILY
Qty: 30 CAPSULE | Refills: 0 | Status: SHIPPED | OUTPATIENT
Start: 2023-03-13 | End: 2023-04-12

## 2023-03-13 RX ORDER — NORETHINDRONE ACETATE AND ETHINYL ESTRADIOL AND FERROUS FUMARATE 1MG-20(21)
1 KIT ORAL DAILY
Qty: 3 PACKET | Refills: 3 | Status: SHIPPED | OUTPATIENT
Start: 2023-03-13

## 2023-03-13 ASSESSMENT — ANXIETY QUESTIONNAIRES
GAD7 TOTAL SCORE: 4
IF YOU CHECKED OFF ANY PROBLEMS ON THIS QUESTIONNAIRE, HOW DIFFICULT HAVE THESE PROBLEMS MADE IT FOR YOU TO DO YOUR WORK, TAKE CARE OF THINGS AT HOME, OR GET ALONG WITH OTHER PEOPLE: SOMEWHAT DIFFICULT
3. WORRYING TOO MUCH ABOUT DIFFERENT THINGS: 1
1. FEELING NERVOUS, ANXIOUS, OR ON EDGE: 1
6. BECOMING EASILY ANNOYED OR IRRITABLE: 0
5. BEING SO RESTLESS THAT IT IS HARD TO SIT STILL: 0
4. TROUBLE RELAXING: 1
7. FEELING AFRAID AS IF SOMETHING AWFUL MIGHT HAPPEN: 0
2. NOT BEING ABLE TO STOP OR CONTROL WORRYING: 1

## 2023-03-13 ASSESSMENT — PATIENT HEALTH QUESTIONNAIRE - PHQ9
10. IF YOU CHECKED OFF ANY PROBLEMS, HOW DIFFICULT HAVE THESE PROBLEMS MADE IT FOR YOU TO DO YOUR WORK, TAKE CARE OF THINGS AT HOME, OR GET ALONG WITH OTHER PEOPLE: 1
2. FEELING DOWN, DEPRESSED OR HOPELESS: 1
SUM OF ALL RESPONSES TO PHQ QUESTIONS 1-9: 6
8. MOVING OR SPEAKING SO SLOWLY THAT OTHER PEOPLE COULD HAVE NOTICED. OR THE OPPOSITE, BEING SO FIGETY OR RESTLESS THAT YOU HAVE BEEN MOVING AROUND A LOT MORE THAN USUAL: 0
7. TROUBLE CONCENTRATING ON THINGS, SUCH AS READING THE NEWSPAPER OR WATCHING TELEVISION: 1
9. THOUGHTS THAT YOU WOULD BE BETTER OFF DEAD, OR OF HURTING YOURSELF: 0
SUM OF ALL RESPONSES TO PHQ9 QUESTIONS 1 & 2: 2
SUM OF ALL RESPONSES TO PHQ QUESTIONS 1-9: 6
SUM OF ALL RESPONSES TO PHQ QUESTIONS 1-9: 6
1. LITTLE INTEREST OR PLEASURE IN DOING THINGS: 1
3. TROUBLE FALLING OR STAYING ASLEEP: 1
4. FEELING TIRED OR HAVING LITTLE ENERGY: 2
6. FEELING BAD ABOUT YOURSELF - OR THAT YOU ARE A FAILURE OR HAVE LET YOURSELF OR YOUR FAMILY DOWN: 0
5. POOR APPETITE OR OVEREATING: 0
SUM OF ALL RESPONSES TO PHQ QUESTIONS 1-9: 6

## 2023-03-13 NOTE — PROGRESS NOTES
PSYCHIATRY PROGRESS NOTE    Rosibel Speaks  1995 03/13/23  Face to Face time: 30 minutes  PCP: Devendra Van DO    CC:   Chief Complaint   Patient presents with    Follow-up       Patient is a 32 y.o. female without significant past medical history presents the outpatient psychiatric clinic today for evaluation management of depression and anxiety as well as ADHD concerns. A:  Patient's presentation today is indicative of ongoing changes related to a likely seasonal affect of disorder that are making her moods more troublesome and starting to decrease her functionality. We will change her medication regimen at this time to cover some of this a bit better. Diagnosis:  ADHD, inattentive subtype predominant  Seasonal affective disorder    P:   We will plan to maintain the patient's current Adderall XR 30mg daily  We will plan to add citalopram 10mg daily for treatment of seasonal affective disorder. Pt was advised of potential SE including nausea, vomiting, diarrhea, as well as increased frequency of falls and/or suicidal ideations. Medication Monitoring:    - PDMP reviewed: Adderall XR 30mg daily 30-day supply filled 2/7/2023. Follow-up: 4 weeks    Safety: Pt was counseled on the potential for increased suicidal ideations and advised on potential options for dealing with these including hotlines, calling the office, or going to the nearest emergency room. __________________________________________________________________________    S:   Patient endorsed that she was experiencing some seasonal mood changes, stating that she was \"not particularly happy here\". She noted that she was feeling burnt out by work, though she had just started working more so with her supervisor to try and alleviate this. She noted that she did not really end up taking a winter break, ended up working more straight through that.   She has had a lot of difficulties with scheduling licensure examinations and getting done the things that she knows that she needs to. Some of this is also exacerbated by her apparent need to stay home and feeling an overwhelming amount of stress with regard to leaving. Existential questions such as Brian Fore is this all for? \"  were asked aloud during the evaluation today, with the patient struggling to find what her meaning/purpose is at the moment. She identified that she'd focused all her life on being an independent female in her career field and now she wasn't sure why she'd done that. She stated that this was a difficult conclusion for her to come to because it would mean that she needed help to figure some things out, which she felt that she was not raised to do. The patient denied any SI/HI/AVH on evaluation. ROS:  Review of Systems   Constitutional:  Positive for fatigue. HENT: Negative. Eyes: Negative. Respiratory: Negative. Cardiovascular: Negative. Gastrointestinal: Negative. Endocrine: Negative. Genitourinary: Negative. Musculoskeletal: Negative. Skin: Negative. Allergic/Immunologic: Negative. Neurological: Negative. Hematological: Negative. Psychiatric/Behavioral:  Positive for dysphoric mood. The patient is nervous/anxious. Brief Medical Hx:   Patient Active Problem List   Diagnosis    ADHD, predominantly inattentive type    Seasonal affective disorder ([facility] [address]        Brief Psych Hx:  Hosp: Denied  Diagnoses: ADHD  Med trials: Bupropion (insomnia)  Outpt: Previously saw Dr. Radha Stewart  NSSI: Denied  Suicide Attempts: Denied    O:  Wt Readings from Last 3 Encounters:   03/13/23 126 lb (57.2 kg)   02/02/23 127 lb 6.4 oz (57.8 kg)   11/21/22 126 lb 3.2 oz (57.2 kg)       Vitals:    03/13/23 1504   BP: 118/70   Pulse: 92   SpO2: 92%   Weight: 126 lb (57.2 kg)       Mental Status Exam:   Appearance:    Appropriately dressed  Motor: No abnormal movements, tics or mannerisms.   Eye Contact: Generally good  Speech:    Appropriate rate and rhythm  Language:   Appropriate diction  Mood/Affect:  \" Not great\"/mild blunting apparent  Thought Process:   Generally linear, logical  Thought Content:    Topic-appropriate, no SI/HI  Hallucinations:   Denied, not seem to be responding to internal stimuli  Associations:   Intact  Attention/Concentration:   Intact  Orientation:    Alert and oriented x4  Memory:   Intact  Fund of Knowledge:    Appropriate for age and education  Insight/Judgement:   Intact/intact      PHQ-9 Questionaire 3/13/2023 2/2/2023   Little interest or pleasure in doing things 1 0   Feeling down, depressed, or hopeless 1 0   Trouble falling or staying asleep, or sleeping too much 1 0   Feeling tired or having little energy 2 0   Poor appetite or overeating 0 0   Feeling bad about yourself - or that you are a failure or have let yourself or your family down 0 0   Trouble concentrating on things, such as reading the newspaper or watching television 1 0   Moving or speaking so slowly that other people could have noticed. Or the opposite - being so fidgety or restless that you have been moving around a lot more than usual 0 0   Thoughts that you would be better off dead, or of hurting yourself in some way 0 0   PHQ-9 Total Score 6 0   If you checked off any problems, how difficult have these problems made it for you to do your work, take care of things at home, or get along with other people?  1 0     JESUS ALBERTO-7 SCREENING 3/13/2023 11/28/2022   Feeling nervous, anxious, or on edge Several days Several days   Not being able to stop or control worrying Several days Several days   Worrying too much about different things Several days Several days   Trouble relaxing Several days Several days   Being so restless that it is hard to sit still Not at all Not at all   Becoming easily annoyed or irritable Not at all Not at all   Feeling afraid as if something awful might happen Not at all Not at all   JESUS ALBERTO-7 Total Score 4 4   How difficult have these problems made it for you to do your work, take care of things at home, or get along with other people? Somewhat difficult Somewhat difficult        Labs:     Orders Only on 08/24/2022   Component Date Value Ref Range Status    TSH 08/24/2022 4.15  0.27 - 4.20 uIU/mL Final    Sodium 08/24/2022 138  136 - 145 mmol/L Final    Potassium 08/24/2022 3.8  3.5 - 5.1 mmol/L Final    Chloride 08/24/2022 102  99 - 110 mmol/L Final    CO2 08/24/2022 25  21 - 32 mmol/L Final    Anion Gap 08/24/2022 11  3 - 16 Final    Glucose 08/24/2022 86  70 - 99 mg/dL Final    BUN 08/24/2022 12  7 - 20 mg/dL Final    Creatinine 08/24/2022 1.0  0.6 - 1.1 mg/dL Final    GFR Non- 08/24/2022 >60  >60 Final    Comment: >60 mL/min/1.73m2 EGFR, calc. for ages 25 and older using the  MDRD formula (not corrected for weight), is valid for stable  renal function. GFR  08/24/2022 >60  >60 Final    Comment: Chronic Kidney Disease: less than 60 ml/min/1.73 sq.m. Kidney Failure: less than 15 ml/min/1.73 sq.m. Results valid for patients 18 years and older.       Calcium 08/24/2022 9.8  8.3 - 10.6 mg/dL Final    Total Protein 08/24/2022 7.0  6.4 - 8.2 g/dL Final    Albumin 08/24/2022 4.6  3.4 - 5.0 g/dL Final    Albumin/Globulin Ratio 08/24/2022 1.9  1.1 - 2.2 Final    Total Bilirubin 08/24/2022 0.4  0.0 - 1.0 mg/dL Final    Alkaline Phosphatase 08/24/2022 58  40 - 129 U/L Final    ALT 08/24/2022 16  10 - 40 U/L Final    AST 08/24/2022 17  15 - 37 U/L Final       EKG: None on file        Jane Madrid MD  Psychiatrist

## 2023-03-16 ASSESSMENT — ENCOUNTER SYMPTOMS
RESPIRATORY NEGATIVE: 1
ALLERGIC/IMMUNOLOGIC NEGATIVE: 1
GASTROINTESTINAL NEGATIVE: 1
EYES NEGATIVE: 1

## 2023-04-19 ENCOUNTER — TELEPHONE (OUTPATIENT)
Dept: INTERNAL MEDICINE CLINIC | Age: 28
End: 2023-04-19

## 2023-04-19 DIAGNOSIS — F90.0 ADHD, PREDOMINANTLY INATTENTIVE TYPE: ICD-10-CM

## 2023-04-19 RX ORDER — DEXTROAMPHETAMINE SACCHARATE, AMPHETAMINE ASPARTATE MONOHYDRATE, DEXTROAMPHETAMINE SULFATE AND AMPHETAMINE SULFATE 7.5; 7.5; 7.5; 7.5 MG/1; MG/1; MG/1; MG/1
30 CAPSULE, EXTENDED RELEASE ORAL DAILY
Qty: 30 CAPSULE | Refills: 0 | Status: SHIPPED | OUTPATIENT
Start: 2023-04-19 | End: 2023-05-19

## 2023-04-19 NOTE — TELEPHONE ENCOUNTER
Patient is calling stated that she put in a request on Monday and still has not had medication refilled. Patient is now out of medication and lucked out on Monday when she sent in request because her pharmacy has medication in stock. She checked again today before calling and they still have in stock as of now. MA sent to Dr. Grace Maddox but not sure if message was seen. amphetamine-dextroamphetamine (ADDERALL XR) 30 MG extended release capsule  Last office visit: 03/13/23     Next office visit: 04/24/23     MA. MICHAEL     Still currently do not have access pulled from My Chart for MA. Patient wanted to know if Dr. Maxx Miramontes can refill this medication since Dr. Grace Maddox is not in office today.      Please advise

## 2023-04-24 ENCOUNTER — OFFICE VISIT (OUTPATIENT)
Dept: PSYCHIATRY | Age: 28
End: 2023-04-24
Payer: COMMERCIAL

## 2023-04-24 VITALS — WEIGHT: 128 LBS | SYSTOLIC BLOOD PRESSURE: 102 MMHG | BODY MASS INDEX: 20.66 KG/M2 | DIASTOLIC BLOOD PRESSURE: 78 MMHG

## 2023-04-24 DIAGNOSIS — F90.0 ADHD, PREDOMINANTLY INATTENTIVE TYPE: Primary | ICD-10-CM

## 2023-04-24 DIAGNOSIS — F33.8 SEASONAL AFFECTIVE DISORDER (HCC): ICD-10-CM

## 2023-04-24 PROCEDURE — 99214 OFFICE O/P EST MOD 30 MIN: CPT | Performed by: STUDENT IN AN ORGANIZED HEALTH CARE EDUCATION/TRAINING PROGRAM

## 2023-04-24 RX ORDER — DEXTROAMPHETAMINE SACCHARATE, AMPHETAMINE ASPARTATE MONOHYDRATE, DEXTROAMPHETAMINE SULFATE AND AMPHETAMINE SULFATE 7.5; 7.5; 7.5; 7.5 MG/1; MG/1; MG/1; MG/1
30 CAPSULE, EXTENDED RELEASE ORAL DAILY
Qty: 30 CAPSULE | Refills: 0 | Status: SHIPPED | OUTPATIENT
Start: 2023-05-19 | End: 2023-06-18

## 2023-04-24 ASSESSMENT — PATIENT HEALTH QUESTIONNAIRE - PHQ9
2. FEELING DOWN, DEPRESSED OR HOPELESS: 1
SUM OF ALL RESPONSES TO PHQ QUESTIONS 1-9: 5
6. FEELING BAD ABOUT YOURSELF - OR THAT YOU ARE A FAILURE OR HAVE LET YOURSELF OR YOUR FAMILY DOWN: 0
SUM OF ALL RESPONSES TO PHQ9 QUESTIONS 1 & 2: 2
SUM OF ALL RESPONSES TO PHQ QUESTIONS 1-9: 5
SUM OF ALL RESPONSES TO PHQ QUESTIONS 1-9: 5
1. LITTLE INTEREST OR PLEASURE IN DOING THINGS: 1
9. THOUGHTS THAT YOU WOULD BE BETTER OFF DEAD, OR OF HURTING YOURSELF: 0
10. IF YOU CHECKED OFF ANY PROBLEMS, HOW DIFFICULT HAVE THESE PROBLEMS MADE IT FOR YOU TO DO YOUR WORK, TAKE CARE OF THINGS AT HOME, OR GET ALONG WITH OTHER PEOPLE: 1
3. TROUBLE FALLING OR STAYING ASLEEP: 0
4. FEELING TIRED OR HAVING LITTLE ENERGY: 2
5. POOR APPETITE OR OVEREATING: 0
7. TROUBLE CONCENTRATING ON THINGS, SUCH AS READING THE NEWSPAPER OR WATCHING TELEVISION: 1
8. MOVING OR SPEAKING SO SLOWLY THAT OTHER PEOPLE COULD HAVE NOTICED. OR THE OPPOSITE, BEING SO FIGETY OR RESTLESS THAT YOU HAVE BEEN MOVING AROUND A LOT MORE THAN USUAL: 0
SUM OF ALL RESPONSES TO PHQ QUESTIONS 1-9: 5

## 2023-04-24 ASSESSMENT — ANXIETY QUESTIONNAIRES
2. NOT BEING ABLE TO STOP OR CONTROL WORRYING: 1
6. BECOMING EASILY ANNOYED OR IRRITABLE: 0
5. BEING SO RESTLESS THAT IT IS HARD TO SIT STILL: 0
1. FEELING NERVOUS, ANXIOUS, OR ON EDGE: 1
7. FEELING AFRAID AS IF SOMETHING AWFUL MIGHT HAPPEN: 0
GAD7 TOTAL SCORE: 4
3. WORRYING TOO MUCH ABOUT DIFFERENT THINGS: 1
4. TROUBLE RELAXING: 1
IF YOU CHECKED OFF ANY PROBLEMS ON THIS QUESTIONNAIRE, HOW DIFFICULT HAVE THESE PROBLEMS MADE IT FOR YOU TO DO YOUR WORK, TAKE CARE OF THINGS AT HOME, OR GET ALONG WITH OTHER PEOPLE: SOMEWHAT DIFFICULT

## 2023-04-24 ASSESSMENT — ENCOUNTER SYMPTOMS
ALLERGIC/IMMUNOLOGIC NEGATIVE: 1
GASTROINTESTINAL NEGATIVE: 1
EYES NEGATIVE: 1
RESPIRATORY NEGATIVE: 1

## 2023-06-26 ENCOUNTER — OFFICE VISIT (OUTPATIENT)
Dept: PSYCHIATRY | Age: 28
End: 2023-06-26
Payer: COMMERCIAL

## 2023-06-26 VITALS
RESPIRATION RATE: 12 BRPM | BODY MASS INDEX: 20.47 KG/M2 | HEART RATE: 68 BPM | DIASTOLIC BLOOD PRESSURE: 78 MMHG | SYSTOLIC BLOOD PRESSURE: 112 MMHG | WEIGHT: 126.8 LBS

## 2023-06-26 DIAGNOSIS — F90.0 ADHD, PREDOMINANTLY INATTENTIVE TYPE: Primary | ICD-10-CM

## 2023-06-26 PROCEDURE — 99214 OFFICE O/P EST MOD 30 MIN: CPT | Performed by: STUDENT IN AN ORGANIZED HEALTH CARE EDUCATION/TRAINING PROGRAM

## 2023-06-26 RX ORDER — DEXTROAMPHETAMINE SACCHARATE, AMPHETAMINE ASPARTATE MONOHYDRATE, DEXTROAMPHETAMINE SULFATE AND AMPHETAMINE SULFATE 7.5; 7.5; 7.5; 7.5 MG/1; MG/1; MG/1; MG/1
30 CAPSULE, EXTENDED RELEASE ORAL DAILY
Qty: 30 CAPSULE | Refills: 0 | Status: SHIPPED | OUTPATIENT
Start: 2023-08-21 | End: 2023-09-20

## 2023-06-26 RX ORDER — DEXTROAMPHETAMINE SACCHARATE, AMPHETAMINE ASPARTATE MONOHYDRATE, DEXTROAMPHETAMINE SULFATE AND AMPHETAMINE SULFATE 7.5; 7.5; 7.5; 7.5 MG/1; MG/1; MG/1; MG/1
30 CAPSULE, EXTENDED RELEASE ORAL DAILY
Qty: 30 CAPSULE | Refills: 0 | Status: SHIPPED | OUTPATIENT
Start: 2023-07-24 | End: 2023-08-23

## 2023-06-26 RX ORDER — DEXTROAMPHETAMINE SACCHARATE, AMPHETAMINE ASPARTATE MONOHYDRATE, DEXTROAMPHETAMINE SULFATE AND AMPHETAMINE SULFATE 7.5; 7.5; 7.5; 7.5 MG/1; MG/1; MG/1; MG/1
30 CAPSULE, EXTENDED RELEASE ORAL DAILY
Qty: 30 CAPSULE | Refills: 0 | Status: SHIPPED | OUTPATIENT
Start: 2023-06-26 | End: 2023-07-26

## 2023-06-26 ASSESSMENT — PATIENT HEALTH QUESTIONNAIRE - PHQ9
10. IF YOU CHECKED OFF ANY PROBLEMS, HOW DIFFICULT HAVE THESE PROBLEMS MADE IT FOR YOU TO DO YOUR WORK, TAKE CARE OF THINGS AT HOME, OR GET ALONG WITH OTHER PEOPLE: 3
SUM OF ALL RESPONSES TO PHQ QUESTIONS 1-9: 8
3. TROUBLE FALLING OR STAYING ASLEEP: 0
SUM OF ALL RESPONSES TO PHQ QUESTIONS 1-9: 8
6. FEELING BAD ABOUT YOURSELF - OR THAT YOU ARE A FAILURE OR HAVE LET YOURSELF OR YOUR FAMILY DOWN: 1
5. POOR APPETITE OR OVEREATING: 0
4. FEELING TIRED OR HAVING LITTLE ENERGY: 3
SUM OF ALL RESPONSES TO PHQ QUESTIONS 1-9: 8
2. FEELING DOWN, DEPRESSED OR HOPELESS: 1
SUM OF ALL RESPONSES TO PHQ QUESTIONS 1-9: 8
1. LITTLE INTEREST OR PLEASURE IN DOING THINGS: 2
7. TROUBLE CONCENTRATING ON THINGS, SUCH AS READING THE NEWSPAPER OR WATCHING TELEVISION: 1
SUM OF ALL RESPONSES TO PHQ9 QUESTIONS 1 & 2: 3
8. MOVING OR SPEAKING SO SLOWLY THAT OTHER PEOPLE COULD HAVE NOTICED. OR THE OPPOSITE, BEING SO FIGETY OR RESTLESS THAT YOU HAVE BEEN MOVING AROUND A LOT MORE THAN USUAL: 0
9. THOUGHTS THAT YOU WOULD BE BETTER OFF DEAD, OR OF HURTING YOURSELF: 0

## 2023-06-26 ASSESSMENT — ANXIETY QUESTIONNAIRES
1. FEELING NERVOUS, ANXIOUS, OR ON EDGE: 1
3. WORRYING TOO MUCH ABOUT DIFFERENT THINGS: 1
5. BEING SO RESTLESS THAT IT IS HARD TO SIT STILL: 0
2. NOT BEING ABLE TO STOP OR CONTROL WORRYING: 1
6. BECOMING EASILY ANNOYED OR IRRITABLE: 0
7. FEELING AFRAID AS IF SOMETHING AWFUL MIGHT HAPPEN: 0
IF YOU CHECKED OFF ANY PROBLEMS ON THIS QUESTIONNAIRE, HOW DIFFICULT HAVE THESE PROBLEMS MADE IT FOR YOU TO DO YOUR WORK, TAKE CARE OF THINGS AT HOME, OR GET ALONG WITH OTHER PEOPLE: SOMEWHAT DIFFICULT
4. TROUBLE RELAXING: 1
GAD7 TOTAL SCORE: 4

## 2023-07-17 ENCOUNTER — OFFICE VISIT (OUTPATIENT)
Dept: INTERNAL MEDICINE CLINIC | Age: 28
End: 2023-07-17
Payer: COMMERCIAL

## 2023-07-17 VITALS
SYSTOLIC BLOOD PRESSURE: 102 MMHG | BODY MASS INDEX: 20.43 KG/M2 | HEART RATE: 81 BPM | DIASTOLIC BLOOD PRESSURE: 60 MMHG | RESPIRATION RATE: 12 BRPM | OXYGEN SATURATION: 99 % | WEIGHT: 126.6 LBS

## 2023-07-17 DIAGNOSIS — R53.83 OTHER FATIGUE: ICD-10-CM

## 2023-07-17 DIAGNOSIS — R53.83 OTHER FATIGUE: Primary | ICD-10-CM

## 2023-07-17 LAB
25(OH)D3 SERPL-MCNC: 67 NG/ML
ALBUMIN SERPL-MCNC: 4.3 G/DL (ref 3.4–5)
ALBUMIN/GLOB SERPL: 2 {RATIO} (ref 1.1–2.2)
ALP SERPL-CCNC: 47 U/L (ref 40–129)
ALT SERPL-CCNC: 17 U/L (ref 10–40)
ANION GAP SERPL CALCULATED.3IONS-SCNC: 14 MMOL/L (ref 3–16)
AST SERPL-CCNC: 14 U/L (ref 15–37)
BASOPHILS # BLD: 0 K/UL (ref 0–0.2)
BASOPHILS NFR BLD: 0.7 %
BILIRUB SERPL-MCNC: 0.4 MG/DL (ref 0–1)
BUN SERPL-MCNC: 15 MG/DL (ref 7–20)
CALCIUM SERPL-MCNC: 9.3 MG/DL (ref 8.3–10.6)
CHLORIDE SERPL-SCNC: 103 MMOL/L (ref 99–110)
CO2 SERPL-SCNC: 23 MMOL/L (ref 21–32)
CREAT SERPL-MCNC: 1.1 MG/DL (ref 0.6–1.1)
DEPRECATED RDW RBC AUTO: 12.7 % (ref 12.4–15.4)
EOSINOPHIL # BLD: 0.1 K/UL (ref 0–0.6)
EOSINOPHIL NFR BLD: 1.7 %
GFR SERPLBLD CREATININE-BSD FMLA CKD-EPI: >60 ML/MIN/{1.73_M2}
GLUCOSE SERPL-MCNC: 79 MG/DL (ref 70–99)
HCT VFR BLD AUTO: 41.1 % (ref 36–48)
HGB BLD-MCNC: 14.2 G/DL (ref 12–16)
LYMPHOCYTES # BLD: 2 K/UL (ref 1–5.1)
LYMPHOCYTES NFR BLD: 39.5 %
MCH RBC QN AUTO: 33.1 PG (ref 26–34)
MCHC RBC AUTO-ENTMCNC: 34.6 G/DL (ref 31–36)
MCV RBC AUTO: 95.5 FL (ref 80–100)
MONOCYTES # BLD: 0.2 K/UL (ref 0–1.3)
MONOCYTES NFR BLD: 4.4 %
NEUTROPHILS # BLD: 2.7 K/UL (ref 1.7–7.7)
NEUTROPHILS NFR BLD: 53.7 %
PLATELET # BLD AUTO: 218 K/UL (ref 135–450)
PMV BLD AUTO: 8.7 FL (ref 5–10.5)
POTASSIUM SERPL-SCNC: 4.4 MMOL/L (ref 3.5–5.1)
PROT SERPL-MCNC: 6.5 G/DL (ref 6.4–8.2)
RBC # BLD AUTO: 4.3 M/UL (ref 4–5.2)
SODIUM SERPL-SCNC: 140 MMOL/L (ref 136–145)
T4 FREE SERPL-MCNC: 1.3 NG/DL (ref 0.9–1.8)
TSH SERPL DL<=0.005 MIU/L-ACNC: 5.79 UIU/ML (ref 0.27–4.2)
WBC # BLD AUTO: 5 K/UL (ref 4–11)

## 2023-07-17 PROCEDURE — 90715 TDAP VACCINE 7 YRS/> IM: CPT | Performed by: INTERNAL MEDICINE

## 2023-07-17 PROCEDURE — 99214 OFFICE O/P EST MOD 30 MIN: CPT | Performed by: INTERNAL MEDICINE

## 2023-07-17 PROCEDURE — 90471 IMMUNIZATION ADMIN: CPT | Performed by: INTERNAL MEDICINE

## 2023-07-17 NOTE — PROGRESS NOTES
Brooks Corona (:  1995) is a 29 y.o. female,Established patient, here for evaluation of the following chief complaint(s):  Fatigue (X6 months, sees Dr Michael Carey. Thought it was seasonal and stress related but those have been eliminated. Patient states she feels like she could nap every day but does not have the time. Sleeping 10+ hours but still feeling exhausted )          ASSESSMENT/PLAN:  1. Other fatigue  Patient with on going fatigue despite stable mental health. Will evaluate further with tsh, cmp, cbc and vitamin d level. May need to consider sleep study.   -     TSH with Reflex; Future  -     Comprehensive Metabolic Panel; Future  -     CBC with Auto Differential; Future  -     Vitamin D 25 Hydroxy; Future      Return in about 6 months (around 2024). SUBJECTIVE/OBJECTIVE:  HPI    Patient is a 28 yo fm with pmhx adhd and seasonal affective disorder who presents with on going fatgiue. About 6 months of being tired. Sleeping a lot. Never rested. Stressor have gotten better and fatigue has gotten worse. Anxiety and depression feel well controlled per her and her psychiatrist. Concerned there could be another reason for her fatigue. ADHD feels well controlled. Review of Systems ROS negative except for those noted in the HPI above. Vitals:    23 0758   BP: 102/60   Pulse: 81   Resp: 12   SpO2: 99%       Physical Exam  Constitutional:       General: She is not in acute distress. Appearance: Normal appearance. She is not ill-appearing. HENT:      Head: Normocephalic and atraumatic. Right Ear: External ear normal.      Left Ear: External ear normal.   Eyes:      General: No scleral icterus. Extraocular Movements: Extraocular movements intact. Conjunctiva/sclera: Conjunctivae normal.   Cardiovascular:      Rate and Rhythm: Normal rate and regular rhythm. Pulses: Normal pulses. Heart sounds: No murmur heard. No friction rub. No gallop.

## 2023-07-27 ENCOUNTER — TELEMEDICINE (OUTPATIENT)
Dept: INTERNAL MEDICINE CLINIC | Age: 28
End: 2023-07-27
Payer: COMMERCIAL

## 2023-07-27 DIAGNOSIS — E03.9 HYPOTHYROIDISM, UNSPECIFIED TYPE: Primary | ICD-10-CM

## 2023-07-27 DIAGNOSIS — E03.9 HYPOTHYROIDISM, UNSPECIFIED TYPE: ICD-10-CM

## 2023-07-27 DIAGNOSIS — F90.0 ADHD, PREDOMINANTLY INATTENTIVE TYPE: ICD-10-CM

## 2023-07-27 PROCEDURE — 99214 OFFICE O/P EST MOD 30 MIN: CPT | Performed by: INTERNAL MEDICINE

## 2023-07-27 RX ORDER — DEXTROAMPHETAMINE SACCHARATE, AMPHETAMINE ASPARTATE MONOHYDRATE, DEXTROAMPHETAMINE SULFATE AND AMPHETAMINE SULFATE 7.5; 7.5; 7.5; 7.5 MG/1; MG/1; MG/1; MG/1
30 CAPSULE, EXTENDED RELEASE ORAL DAILY
Qty: 30 CAPSULE | Refills: 0 | Status: SHIPPED | OUTPATIENT
Start: 2023-07-27 | End: 2023-08-26

## 2023-07-27 RX ORDER — LEVOTHYROXINE SODIUM 0.03 MG/1
25 TABLET ORAL DAILY
Qty: 30 TABLET | Refills: 0 | Status: SHIPPED | OUTPATIENT
Start: 2023-07-27

## 2023-07-27 NOTE — PROGRESS NOTES
Sarah Montoya (:  1995) is a 29 y.o. female,Established patient, here for evaluation of the following chief complaint(s):  Results      There were no vitals filed for this visit. ASSESSMENT/PLAN:  1. Hypothyroidism, unspecified type  Patient with elevated TSH and normal free T4 however having significant symptoms of fatigue with no other cause. Discussed treating this with Synthroid versus watching and waiting. Patient would like to proceed with Synthroid. We will start a very low dose. We will recheck labs in 4 weeks. -     Thyroid Peroxidase Antibody; Future  -     levothyroxine (SYNTHROID) 25 MCG tablet; Take 1 tablet by mouth daily, Disp-30 tablet, R-0Normal  -     TSH with Reflex; Future  2. ADHD, predominantly inattentive type  Well-controlled continue on current Adderall dose  -     amphetamine-dextroamphetamine (ADDERALL XR) 30 MG extended release capsule; Take 1 capsule by mouth daily for 30 days. , Disp-30 capsule, R-0Normal      Return in about 4 weeks (around 2023) for thyroid . SUBJECTIVE/OBJECTIVE:  HPI    Still feeling very sleeping. Review of Systems    There were no vitals filed for this visit. Physical Exam      An electronic signature was used to authenticate this note.     --Jose Cowan DO

## 2023-07-28 RX ORDER — LEVOTHYROXINE SODIUM 0.03 MG/1
25 TABLET ORAL DAILY
Qty: 90 TABLET | Refills: 0 | OUTPATIENT
Start: 2023-07-28

## 2023-07-28 NOTE — TELEPHONE ENCOUNTER
Last appointment: 7/27/2023  Next appointment: 8/24/2023  Last refill: 90 day supply requested. Please advise as DOD.  Thank you

## 2023-08-24 ENCOUNTER — OFFICE VISIT (OUTPATIENT)
Dept: INTERNAL MEDICINE CLINIC | Age: 28
End: 2023-08-24
Payer: COMMERCIAL

## 2023-08-24 VITALS — WEIGHT: 125.8 LBS | DIASTOLIC BLOOD PRESSURE: 64 MMHG | SYSTOLIC BLOOD PRESSURE: 119 MMHG | BODY MASS INDEX: 20.3 KG/M2

## 2023-08-24 DIAGNOSIS — E03.8 SUBCLINICAL HYPOTHYROIDISM: Primary | ICD-10-CM

## 2023-08-24 DIAGNOSIS — E03.8 SUBCLINICAL HYPOTHYROIDISM: ICD-10-CM

## 2023-08-24 PROCEDURE — 99214 OFFICE O/P EST MOD 30 MIN: CPT | Performed by: INTERNAL MEDICINE

## 2023-08-24 RX ORDER — NORETHINDRONE ACETATE AND ETHINYL ESTRADIOL AND FERROUS FUMARATE 1MG-20(21)
KIT ORAL
Qty: 3 PACKET | Refills: 3 | Status: SHIPPED | OUTPATIENT
Start: 2023-08-24 | End: 2023-08-25 | Stop reason: SDUPTHER

## 2023-08-24 NOTE — PROGRESS NOTES
Psychiatric:         Mood and Affect: Mood normal.         Behavior: Behavior normal.         An electronic signature was used to authenticate this note.     --Lilliam Khan DO

## 2023-08-25 ENCOUNTER — PATIENT MESSAGE (OUTPATIENT)
Dept: INTERNAL MEDICINE CLINIC | Age: 28
End: 2023-08-25

## 2023-08-25 LAB — TSH SERPL DL<=0.005 MIU/L-ACNC: 2.09 UIU/ML (ref 0.27–4.2)

## 2023-08-25 RX ORDER — NORETHINDRONE ACETATE AND ETHINYL ESTRADIOL AND FERROUS FUMARATE 1MG-20(21)
KIT ORAL
Qty: 3 PACKET | Refills: 0 | Status: SHIPPED | OUTPATIENT
Start: 2023-08-25

## 2023-08-25 NOTE — TELEPHONE ENCOUNTER
From: Saniya Alejandro  To: Dr. Leary Inch: 8/25/2023 9:38 AM EDT  Subject: Denise Handley Men,  I called [facility] and they said that my prescription is showing up as an \"84/84\" day supply and needs to be written as \"63/84\" to omit the inactive pills. Can you please update with Kenny? Thanks! Have a good weekend!    Clint Tesfaye

## 2023-09-03 DIAGNOSIS — E03.9 HYPOTHYROIDISM, UNSPECIFIED TYPE: ICD-10-CM

## 2023-09-03 RX ORDER — LEVOTHYROXINE SODIUM 0.03 MG/1
25 TABLET ORAL DAILY
Qty: 30 TABLET | Refills: 0 | OUTPATIENT
Start: 2023-09-03

## 2023-09-29 DIAGNOSIS — F90.0 ADHD, PREDOMINANTLY INATTENTIVE TYPE: ICD-10-CM

## 2023-09-29 RX ORDER — DEXTROAMPHETAMINE SACCHARATE, AMPHETAMINE ASPARTATE MONOHYDRATE, DEXTROAMPHETAMINE SULFATE AND AMPHETAMINE SULFATE 7.5; 7.5; 7.5; 7.5 MG/1; MG/1; MG/1; MG/1
30 CAPSULE, EXTENDED RELEASE ORAL DAILY
Qty: 30 CAPSULE | Refills: 0 | Status: SHIPPED | OUTPATIENT
Start: 2023-09-29 | End: 2023-11-02 | Stop reason: SDUPTHER

## 2023-09-29 NOTE — TELEPHONE ENCOUNTER
Last appointment: 8/24/2023  Next appointment: Visit date not found  Last refill: 8/26/2023        She is seeing Dr. Emmanuelle Evans on Monday. She is not ready to set up a visit with a PCP right now. She does not feel the need to see one at the moment until she gets answer from Mondays visit. She also wants to know if Dr. Enma Dutton will go back to managing her Adderall.     She does need this sent in today, can not wait until monday

## 2023-09-29 NOTE — TELEPHONE ENCOUNTER
I will fill her Adderall for 30 days, but she needs to establish with new PCP for subsequent refills or ask Dr. Caesar Castillo if he will fill for her

## 2023-10-02 ENCOUNTER — OFFICE VISIT (OUTPATIENT)
Dept: ENDOCRINOLOGY | Age: 28
End: 2023-10-02
Payer: COMMERCIAL

## 2023-10-02 VITALS
OXYGEN SATURATION: 99 % | WEIGHT: 125.4 LBS | HEART RATE: 97 BPM | HEIGHT: 66 IN | DIASTOLIC BLOOD PRESSURE: 80 MMHG | BODY MASS INDEX: 20.15 KG/M2 | SYSTOLIC BLOOD PRESSURE: 120 MMHG

## 2023-10-02 DIAGNOSIS — E53.8: ICD-10-CM

## 2023-10-02 DIAGNOSIS — L70.0 ACNE VULGARIS: ICD-10-CM

## 2023-10-02 DIAGNOSIS — R53.82 CHRONIC FATIGUE: Primary | ICD-10-CM

## 2023-10-02 DIAGNOSIS — E03.8 SUBCLINICAL HYPOTHYROIDISM: ICD-10-CM

## 2023-10-02 DIAGNOSIS — E28.2 PCOS (POLYCYSTIC OVARIAN SYNDROME): ICD-10-CM

## 2023-10-02 PROCEDURE — 99204 OFFICE O/P NEW MOD 45 MIN: CPT | Performed by: INTERNAL MEDICINE

## 2023-10-02 RX ORDER — TAZAROTENE 0.45 MG/G
LOTION TOPICAL
COMMUNITY
Start: 2023-09-18

## 2023-10-02 RX ORDER — SPIRONOLACTONE 50 MG/1
50 TABLET, FILM COATED ORAL DAILY
COMMUNITY
Start: 2023-09-18

## 2023-10-02 RX ORDER — DOXYCYCLINE 40 MG/1
CAPSULE ORAL
COMMUNITY
Start: 2023-09-18

## 2023-10-02 RX ORDER — ROFLUMILAST 3 MG/G
CREAM TOPICAL
COMMUNITY

## 2023-10-02 RX ORDER — BRIMONID/IVERMEC/METRONID/NIAC 0.25-1-1 %
GEL (GRAM) TOPICAL
COMMUNITY

## 2023-10-02 NOTE — PROGRESS NOTES
Patient ID: Lisy Holloway is a 29 y.o. female      Chief Complaint: subclinical hypothyroidism     HPI:   Lisy Holloway is here for initial evaluation of subclinical hypothyroidism     TSH 5.79, Free T4 1.3 - July 2023 . Checked for fatigue. Levothyroxine 25 mcg was started. A month later TSH 2.09 - Aug 2023. Fatigue did not get better so levothyroxine. Takes Levothyroxine 0 mcg daily in morning empty stomach with water and waits for minutes. Energy levels are low even after good night sleep   Has ADHD. Sees Dr. Jose Bob. No big stresses  Seeing dermatologist for facial dermatitis. Has acne on face and back. Taking spironolactone as per Derm. Weight stable   No heat or cold intolerance   Has anxiety. Because of tiredness , skin issues she thinks mental state has declined  Sweats a lot with yard work. She has tremors   Denies palpitations   Denies compressive neck symptoms   On birth control pills since 12     Family history of thyroid disorder: None. One aunt may be taking thyroid medicine. No neck radiation   MVT some days of the week. Takes biotin 5000 mcg, none in last 2 weeks. No food supplements, herbs or medications   No recent URTI      The following portions of the patient's history were reviewed and updated as appropriate:     Family History   Problem Relation Age of Onset    Stroke Paternal Grandmother     Coronary Art Dis Paternal Grandfather           Social History     Socioeconomic History    Marital status: Single     Spouse name: Not on file    Number of children: 0    Years of education: Not on file    Highest education level:  Bachelor's degree (e.g., BA, AB, BS)   Occupational History    Occupation:    Tobacco Use    Smoking status: Never    Smokeless tobacco: Never   Vaping Use    Vaping Use: Never used   Substance and Sexual Activity    Alcohol use: Yes     Comment: Drink maybe once a month    Drug use: Never    Sexual activity: Not Currently     Partners: Male   Other

## 2023-10-03 LAB
T4 FREE SERPL-MCNC: 1.3 NG/DL (ref 0.9–1.8)
THYROPEROXIDASE AB SERPL IA-ACNC: 9 IU/ML
TSH SERPL DL<=0.005 MIU/L-ACNC: 2.95 UIU/ML (ref 0.27–4.2)

## 2023-10-11 NOTE — PROGRESS NOTES
PSYCHIATRY PROGRESS NOTE    Sarah Montoya  1995  10/12/2023  Face to Face time: 45 minutes, of which 20 minutes were spent in supportive psychotherapy  PCP: No primary care provider on file. CC:   Chief Complaint   Patient presents with    Follow-up       Patient is a 29 y.o. female without significant past medical history presents the outpatient psychiatric clinic today for evaluation management of depression and anxiety as well as ADHD concerns. A:  Patient's presentation today is indicative of some interval worsening of depressive symptoms. It does appear that her ADHD is under adequate control, however she is dealing with ongoing difficulties with fatigue and lower mood. We will attempt to adjust her regimen to provide her with further support. Diagnosis:  ADHD, inattentive subtype predominant  Seasonal affective disorder  Unspecified depression    P:   1. Continue Adderall XR 30 mg daily. 2.  Add venlafaxine 75 mg daily. Pt was advised of potential SE including nausea, vomiting, diarrhea, palpitations, as well as increased frequency of falls and/or suicidal ideations. Medication Monitoring:    - PDMP reviewed: Adderall ER 30mg daily 30-day supply filled 9/29/2023. Follow-up: 4 weeks    Safety: Pt was counseled on the potential for increased suicidal ideations and advised on potential options for dealing with these including hotlines, calling the office, or going to the nearest emergency room. __________________________________________________________________________    S:   Patient endorsed that she was struggling a little bit more at the moment. She noted that her particular difficulties with ongoing fatigue as well as some additional health issues including dermatologic and hormonal.  She has not been able to get to the gym because she physically feels too tired to actually get out to the gym.   She notes that she feels that she would be at risk if she were to try and drive

## 2023-10-12 ENCOUNTER — TELEPHONE (OUTPATIENT)
Dept: PSYCHIATRY | Age: 28
End: 2023-10-12

## 2023-10-12 ENCOUNTER — OFFICE VISIT (OUTPATIENT)
Dept: PSYCHIATRY | Age: 28
End: 2023-10-12
Payer: COMMERCIAL

## 2023-10-12 VITALS
HEIGHT: 66 IN | SYSTOLIC BLOOD PRESSURE: 122 MMHG | DIASTOLIC BLOOD PRESSURE: 72 MMHG | BODY MASS INDEX: 19.93 KG/M2 | WEIGHT: 124 LBS

## 2023-10-12 DIAGNOSIS — F32.9 CURRENT EPISODE OF MAJOR DEPRESSIVE DISORDER WITHOUT PRIOR EPISODE, UNSPECIFIED DEPRESSION EPISODE SEVERITY: ICD-10-CM

## 2023-10-12 DIAGNOSIS — F33.8 SEASONAL AFFECTIVE DISORDER (HCC): Primary | ICD-10-CM

## 2023-10-12 DIAGNOSIS — F90.0 ADHD, PREDOMINANTLY INATTENTIVE TYPE: ICD-10-CM

## 2023-10-12 PROCEDURE — 90833 PSYTX W PT W E/M 30 MIN: CPT | Performed by: STUDENT IN AN ORGANIZED HEALTH CARE EDUCATION/TRAINING PROGRAM

## 2023-10-12 PROCEDURE — 99214 OFFICE O/P EST MOD 30 MIN: CPT | Performed by: STUDENT IN AN ORGANIZED HEALTH CARE EDUCATION/TRAINING PROGRAM

## 2023-10-12 RX ORDER — VENLAFAXINE HYDROCHLORIDE 75 MG/1
75 CAPSULE, EXTENDED RELEASE ORAL DAILY
Qty: 30 CAPSULE | Refills: 2 | Status: SHIPPED | OUTPATIENT
Start: 2023-10-12

## 2023-10-12 ASSESSMENT — ANXIETY QUESTIONNAIRES
6. BECOMING EASILY ANNOYED OR IRRITABLE: 1
4. TROUBLE RELAXING: 1
7. FEELING AFRAID AS IF SOMETHING AWFUL MIGHT HAPPEN: 0
GAD7 TOTAL SCORE: 7
5. BEING SO RESTLESS THAT IT IS HARD TO SIT STILL: 0
IF YOU CHECKED OFF ANY PROBLEMS ON THIS QUESTIONNAIRE, HOW DIFFICULT HAVE THESE PROBLEMS MADE IT FOR YOU TO DO YOUR WORK, TAKE CARE OF THINGS AT HOME, OR GET ALONG WITH OTHER PEOPLE: SOMEWHAT DIFFICULT
1. FEELING NERVOUS, ANXIOUS, OR ON EDGE: 1
2. NOT BEING ABLE TO STOP OR CONTROL WORRYING: 2
3. WORRYING TOO MUCH ABOUT DIFFERENT THINGS: 2

## 2023-10-12 ASSESSMENT — PATIENT HEALTH QUESTIONNAIRE - PHQ9
8. MOVING OR SPEAKING SO SLOWLY THAT OTHER PEOPLE COULD HAVE NOTICED. OR THE OPPOSITE, BEING SO FIGETY OR RESTLESS THAT YOU HAVE BEEN MOVING AROUND A LOT MORE THAN USUAL: 1
6. FEELING BAD ABOUT YOURSELF - OR THAT YOU ARE A FAILURE OR HAVE LET YOURSELF OR YOUR FAMILY DOWN: 2
SUM OF ALL RESPONSES TO PHQ QUESTIONS 1-9: 12
5. POOR APPETITE OR OVEREATING: 0
SUM OF ALL RESPONSES TO PHQ QUESTIONS 1-9: 12
4. FEELING TIRED OR HAVING LITTLE ENERGY: 3
9. THOUGHTS THAT YOU WOULD BE BETTER OFF DEAD, OR OF HURTING YOURSELF: 0
10. IF YOU CHECKED OFF ANY PROBLEMS, HOW DIFFICULT HAVE THESE PROBLEMS MADE IT FOR YOU TO DO YOUR WORK, TAKE CARE OF THINGS AT HOME, OR GET ALONG WITH OTHER PEOPLE: 2
2. FEELING DOWN, DEPRESSED OR HOPELESS: 2
1. LITTLE INTEREST OR PLEASURE IN DOING THINGS: 2
SUM OF ALL RESPONSES TO PHQ QUESTIONS 1-9: 12
3. TROUBLE FALLING OR STAYING ASLEEP: 1
SUM OF ALL RESPONSES TO PHQ9 QUESTIONS 1 & 2: 4
SUM OF ALL RESPONSES TO PHQ QUESTIONS 1-9: 12
7. TROUBLE CONCENTRATING ON THINGS, SUCH AS READING THE NEWSPAPER OR WATCHING TELEVISION: 1

## 2023-10-12 NOTE — TELEPHONE ENCOUNTER
Lvm for patient to call back. Patient dropped off some documents related to billing concerns for the  to review. Per Deisi: These claims have been applied to patient's deductible. Please advise patient to contact her Insurance with further questions.

## 2023-10-12 NOTE — PROGRESS NOTES
Patient present for an OV.   Patient states that she is frustrated with having to find a pcp and psychiatrist.      PHQ:11  JESUS ALBERTO:7

## 2023-10-12 NOTE — TELEPHONE ENCOUNTER
Patient called back with concerns of billing problems. Advised patient  Per Deisi: These claims have been applied to patient's deductible. Please advise patient to contact her Insurance with further questions. Patient requesting to speak with Deisi.

## 2023-10-16 ASSESSMENT — ENCOUNTER SYMPTOMS
RESPIRATORY NEGATIVE: 1
GASTROINTESTINAL NEGATIVE: 1
EYES NEGATIVE: 1
ALLERGIC/IMMUNOLOGIC NEGATIVE: 1

## 2023-10-23 ENCOUNTER — TELEPHONE (OUTPATIENT)
Dept: ENDOCRINOLOGY | Age: 28
End: 2023-10-23

## 2023-10-25 NOTE — TELEPHONE ENCOUNTER
----- Message from Martha Weiner MD sent at 10/22/2023  3:05 PM EDT -----  Please change follow up to 6-12 months
How Severe Are They?: moderate
LMOM X 2  to reschedule appt. If she did not see results in mychart they are as follows-     Testosterone levels are acceptable . Sex hormone binding globulin is high due to estrogen in birth control pills. It does not any effects so you do not have to stop birth control  Other work up is negative   Will change follow up to 6-12 months to recheck thyroid levels  . ..
LMOM X 3 to reschedule appt. If she did not see results in mychart they are as follows-     Testosterone levels are acceptable . Sex hormone binding globulin is high due to estrogen in birth control pills.  It does not any effects so you do not have to stop birth control  Other work up is negative   Will change follow up to 6-12 months to recheck thyroid levels
LMOM to reschedule appt. If she did not see results in mychart they are as follows-    Testosterone levels are acceptable . Sex hormone binding globulin is high due to estrogen in birth control pills. It does not any effects so you do not have to stop birth control  Other work up is negative   Will change follow up to 6-12 months to recheck thyroid levels  . ..
Letter mailed.
Is This A New Presentation, Or A Follow-Up?: Follow Up [facility]
Additional History: Pt had pdt on face ears and neck done 2/2022.

## 2023-11-02 DIAGNOSIS — F90.0 ADHD, PREDOMINANTLY INATTENTIVE TYPE: ICD-10-CM

## 2023-11-02 NOTE — TELEPHONE ENCOUNTER
Former Dr Aldridge patient no appt to establish with new PCP but does see Dr Hardin please advise

## 2023-11-07 RX ORDER — DEXTROAMPHETAMINE SACCHARATE, AMPHETAMINE ASPARTATE MONOHYDRATE, DEXTROAMPHETAMINE SULFATE AND AMPHETAMINE SULFATE 7.5; 7.5; 7.5; 7.5 MG/1; MG/1; MG/1; MG/1
30 CAPSULE, EXTENDED RELEASE ORAL DAILY
Qty: 30 CAPSULE | Refills: 0 | Status: SHIPPED | OUTPATIENT
Start: 2023-11-07 | End: 2023-12-07 | Stop reason: SDUPTHER

## 2023-11-16 ENCOUNTER — OFFICE VISIT (OUTPATIENT)
Dept: PSYCHIATRY | Age: 28
End: 2023-11-16
Payer: COMMERCIAL

## 2023-11-16 VITALS
WEIGHT: 127.8 LBS | HEART RATE: 72 BPM | RESPIRATION RATE: 12 BRPM | BODY MASS INDEX: 20.63 KG/M2 | DIASTOLIC BLOOD PRESSURE: 84 MMHG | SYSTOLIC BLOOD PRESSURE: 136 MMHG

## 2023-11-16 DIAGNOSIS — F90.0 ADHD, PREDOMINANTLY INATTENTIVE TYPE: Primary | ICD-10-CM

## 2023-11-16 DIAGNOSIS — F32.9 CURRENT EPISODE OF MAJOR DEPRESSIVE DISORDER WITHOUT PRIOR EPISODE, UNSPECIFIED DEPRESSION EPISODE SEVERITY: ICD-10-CM

## 2023-11-16 PROCEDURE — 99214 OFFICE O/P EST MOD 30 MIN: CPT | Performed by: STUDENT IN AN ORGANIZED HEALTH CARE EDUCATION/TRAINING PROGRAM

## 2023-11-16 RX ORDER — VENLAFAXINE HYDROCHLORIDE 75 MG/1
75 CAPSULE, EXTENDED RELEASE ORAL DAILY
Qty: 30 CAPSULE | Refills: 2 | Status: SHIPPED | OUTPATIENT
Start: 2023-11-16

## 2023-11-16 ASSESSMENT — PATIENT HEALTH QUESTIONNAIRE - PHQ9
SUM OF ALL RESPONSES TO PHQ QUESTIONS 1-9: 9
3. TROUBLE FALLING OR STAYING ASLEEP: 1
SUM OF ALL RESPONSES TO PHQ9 QUESTIONS 1 & 2: 2
7. TROUBLE CONCENTRATING ON THINGS, SUCH AS READING THE NEWSPAPER OR WATCHING TELEVISION: 2
9. THOUGHTS THAT YOU WOULD BE BETTER OFF DEAD, OR OF HURTING YOURSELF: 0
6. FEELING BAD ABOUT YOURSELF - OR THAT YOU ARE A FAILURE OR HAVE LET YOURSELF OR YOUR FAMILY DOWN: 1
SUM OF ALL RESPONSES TO PHQ QUESTIONS 1-9: 9
8. MOVING OR SPEAKING SO SLOWLY THAT OTHER PEOPLE COULD HAVE NOTICED. OR THE OPPOSITE, BEING SO FIGETY OR RESTLESS THAT YOU HAVE BEEN MOVING AROUND A LOT MORE THAN USUAL: 0
10. IF YOU CHECKED OFF ANY PROBLEMS, HOW DIFFICULT HAVE THESE PROBLEMS MADE IT FOR YOU TO DO YOUR WORK, TAKE CARE OF THINGS AT HOME, OR GET ALONG WITH OTHER PEOPLE: 2
4. FEELING TIRED OR HAVING LITTLE ENERGY: 3
2. FEELING DOWN, DEPRESSED OR HOPELESS: 1
1. LITTLE INTEREST OR PLEASURE IN DOING THINGS: 1
SUM OF ALL RESPONSES TO PHQ QUESTIONS 1-9: 9
SUM OF ALL RESPONSES TO PHQ QUESTIONS 1-9: 9
5. POOR APPETITE OR OVEREATING: 0

## 2023-11-16 ASSESSMENT — ANXIETY QUESTIONNAIRES
4. TROUBLE RELAXING: 1
GAD7 TOTAL SCORE: 5
2. NOT BEING ABLE TO STOP OR CONTROL WORRYING: 1
1. FEELING NERVOUS, ANXIOUS, OR ON EDGE: 1
7. FEELING AFRAID AS IF SOMETHING AWFUL MIGHT HAPPEN: 0
5. BEING SO RESTLESS THAT IT IS HARD TO SIT STILL: 0
IF YOU CHECKED OFF ANY PROBLEMS ON THIS QUESTIONNAIRE, HOW DIFFICULT HAVE THESE PROBLEMS MADE IT FOR YOU TO DO YOUR WORK, TAKE CARE OF THINGS AT HOME, OR GET ALONG WITH OTHER PEOPLE: SOMEWHAT DIFFICULT
3. WORRYING TOO MUCH ABOUT DIFFERENT THINGS: 1
6. BECOMING EASILY ANNOYED OR IRRITABLE: 1

## 2023-11-16 NOTE — PROGRESS NOTES
PSYCHIATRY PROGRESS NOTE    Rosalind Dey  1995 11/16/2023  Face to Face time: 30 minutes  PCP: Collette Seals MD    CC:   Chief Complaint   Patient presents with    Follow-up       Patient is a 29 y.o. female without significant past medical history presents the outpatient psychiatric clinic today for evaluation management of depression and anxiety as well as ADHD concerns. A:  Patient's presentation today is indicative of improvement in her overall ADHD symptomology with the current medication regimen, however it does appear that she does have some seasonal depressive changes. We will attempt to adjust her regimen to provide her with further support. Diagnosis:  ADHD, inattentive subtype predominant  Seasonal affective disorder  Unspecified depression    P:   1. Increase venlafaxine to 225 mg daily for treatment of depression symptoms. Patient was cautioned regarding adverse effects of this medication as have described to her previously. Medication Monitoring:    - PDMP reviewed: Adderall XR 30mg daily 30-day supply filled 11/7/2023. Follow-up: 6 weeks    Safety: Pt was counseled on the potential for increased suicidal ideations and advised on potential options for dealing with these including hotlines, calling the office, or going to the nearest emergency room. __________________________________________________________________________    S:   Patient identified that some days are little bit better than where they were before. She still notes ongoing difficulties with fatigue that may, in her review, be dictated by how much and what she eats as well as when she eats. She notes that there is a tendency for her to get headaches when she feels that her eyes are overly tired. That being said, the patient did identify that she is not crying as much which has helped the sensation.   She notes that with the weather changes that have occurred recently that she is having more depressive

## 2023-12-05 ENCOUNTER — OFFICE VISIT (OUTPATIENT)
Dept: FAMILY MEDICINE CLINIC | Age: 28
End: 2023-12-05
Payer: COMMERCIAL

## 2023-12-05 VITALS
HEART RATE: 112 BPM | OXYGEN SATURATION: 99 % | DIASTOLIC BLOOD PRESSURE: 82 MMHG | WEIGHT: 129.8 LBS | SYSTOLIC BLOOD PRESSURE: 118 MMHG | BODY MASS INDEX: 20.95 KG/M2

## 2023-12-05 DIAGNOSIS — R00.0 TACHYCARDIA: ICD-10-CM

## 2023-12-05 DIAGNOSIS — R53.83 FATIGUE, UNSPECIFIED TYPE: Primary | ICD-10-CM

## 2023-12-05 PROCEDURE — 99214 OFFICE O/P EST MOD 30 MIN: CPT | Performed by: STUDENT IN AN ORGANIZED HEALTH CARE EDUCATION/TRAINING PROGRAM

## 2023-12-05 ASSESSMENT — ENCOUNTER SYMPTOMS
COUGH: 0
ABDOMINAL PAIN: 0
SORE THROAT: 0
NAUSEA: 0
SHORTNESS OF BREATH: 0
RHINORRHEA: 0

## 2023-12-05 NOTE — PROGRESS NOTES
Devan Dominguez is a 29 y. o.female who presents with   Chief Complaint   Patient presents with    New Patient   . Patient presents to [facility] care. She denies any major medical conditions. Reports that since this summer she has been having significant fatigue. Denies any major life changes and endorses having gone through other difficulties in her life and has never felt this way. Reports MRI machine sleep she is still significantly tired. Seeing psychiatry currently and was started on Effexor which she reports she thinks very mildly may have improved her symptoms however she still extremely lethargic. Reports that she has stopped working out because she is afraid she is going to crash while she is driving from hot exhaust that she feels. Denies any smoking. Blood work thus far has been negative for any contributory findings. Patient denies any fevers, chills, blood in her stool or urine. Denies any rashes. Denies any family history of autoimmune diseases. In clinic today patient's heart rate is 112. She denies any chest pain or shortness of breath. Denies any anxiety        Review of Systems   Constitutional:  Positive for fatigue. Negative for chills. HENT:  Negative for rhinorrhea and sore throat. Eyes:  Negative for visual disturbance. Respiratory:  Negative for cough and shortness of breath. Cardiovascular:  Negative for chest pain. Gastrointestinal:  Negative for abdominal pain and nausea. Genitourinary:  Negative for dysuria. Musculoskeletal:  Negative for myalgias. Skin:  Negative for pallor and rash. Neurological:  Negative for dizziness, light-headedness and headaches. Past Medical History:   Diagnosis Date    ADHD (attention deficit hyperactivity disorder)     Anxiety        History reviewed. No pertinent surgical history.     Social History     Socioeconomic History    Marital status: Single     Spouse name: Not on file    Number of children: 0    Years of

## 2023-12-07 DIAGNOSIS — F90.0 ADHD, PREDOMINANTLY INATTENTIVE TYPE: ICD-10-CM

## 2023-12-07 RX ORDER — DEXTROAMPHETAMINE SACCHARATE, AMPHETAMINE ASPARTATE MONOHYDRATE, DEXTROAMPHETAMINE SULFATE AND AMPHETAMINE SULFATE 7.5; 7.5; 7.5; 7.5 MG/1; MG/1; MG/1; MG/1
30 CAPSULE, EXTENDED RELEASE ORAL DAILY
Qty: 30 CAPSULE | Refills: 0 | Status: SHIPPED | OUTPATIENT
Start: 2023-12-07 | End: 2024-01-06

## 2023-12-07 ASSESSMENT — ENCOUNTER SYMPTOMS
ALLERGIC/IMMUNOLOGIC NEGATIVE: 1
RESPIRATORY NEGATIVE: 1
GASTROINTESTINAL NEGATIVE: 1
EYES NEGATIVE: 1

## 2023-12-28 NOTE — PROGRESS NOTES
PSYCHIATRY PROGRESS NOTE    Zeb Mendez  1995 01/04/2024  Face to Face time: 30 minutes  PCP: Lamar Gaviria MD    CC:   Chief Complaint   Patient presents with    Follow-up       Patient is a 28 y.o. female without significant past medical history presents the outpatient psychiatric clinic today for evaluation management of depression and anxiety as well as ADHD concerns.    A:  Patient's presentation today is indicative of continued stability of her underlying ADHD and a general improvement in her underlying mood state despite the fact that she has come off the venlafaxine.  There is an ongoing concern for her high heart rate, though this is not notably correlated with the patient's anxiety level.  There is a possibility that this could be an adverse effect associated with the medication that she is on.  We will continue to follow and provide her with ongoing support.    Diagnosis:  ADHD, inattentive subtype predominant  Seasonal affective disorder  Unspecified depression    P:   1.  Discontinue venlafaxine from medication list.  2.  Continue Adderall XR 30 mg daily.  3.  If elevated heart rate does not resolve as of the next visit, could consider an EKG for evaluation and/or the usage of a medication like propranolol to help with anxiety as well as heart rate concerns    Medication Monitoring:    - PDMP reviewed: Adderall ER 30 mg daily 30-day supply last filled 12/7/2023.    Follow-up: 3 months    Safety: Pt was counseled on the potential for increased suicidal ideations and advised on potential options for dealing with these including hotlines, calling the office, or going to the nearest emergency room.      __________________________________________________________________________    S:   Patient identified that she is actually doing a little bit better now than she was before.  She noted that about a month ago she ended up accidentally coming off of her venlafaxine for a couple of days in a row.

## 2024-01-04 ENCOUNTER — OFFICE VISIT (OUTPATIENT)
Dept: PSYCHIATRY | Age: 29
End: 2024-01-04
Payer: COMMERCIAL

## 2024-01-04 VITALS
DIASTOLIC BLOOD PRESSURE: 74 MMHG | WEIGHT: 129 LBS | BODY MASS INDEX: 20.73 KG/M2 | HEIGHT: 66 IN | SYSTOLIC BLOOD PRESSURE: 114 MMHG

## 2024-01-04 DIAGNOSIS — F33.8 SEASONAL AFFECTIVE DISORDER (HCC): Primary | ICD-10-CM

## 2024-01-04 DIAGNOSIS — F90.0 ADHD, PREDOMINANTLY INATTENTIVE TYPE: ICD-10-CM

## 2024-01-04 PROCEDURE — 99214 OFFICE O/P EST MOD 30 MIN: CPT | Performed by: STUDENT IN AN ORGANIZED HEALTH CARE EDUCATION/TRAINING PROGRAM

## 2024-01-04 RX ORDER — DEXTROAMPHETAMINE SACCHARATE, AMPHETAMINE ASPARTATE MONOHYDRATE, DEXTROAMPHETAMINE SULFATE AND AMPHETAMINE SULFATE 7.5; 7.5; 7.5; 7.5 MG/1; MG/1; MG/1; MG/1
30 CAPSULE, EXTENDED RELEASE ORAL DAILY
Qty: 30 CAPSULE | Refills: 0 | Status: SHIPPED | OUTPATIENT
Start: 2024-02-01 | End: 2024-03-02

## 2024-01-04 RX ORDER — DEXTROAMPHETAMINE SACCHARATE, AMPHETAMINE ASPARTATE MONOHYDRATE, DEXTROAMPHETAMINE SULFATE AND AMPHETAMINE SULFATE 7.5; 7.5; 7.5; 7.5 MG/1; MG/1; MG/1; MG/1
30 CAPSULE, EXTENDED RELEASE ORAL DAILY
Qty: 30 CAPSULE | Refills: 0 | Status: SHIPPED | OUTPATIENT
Start: 2024-02-29 | End: 2024-03-30

## 2024-01-04 RX ORDER — DEXTROAMPHETAMINE SACCHARATE, AMPHETAMINE ASPARTATE MONOHYDRATE, DEXTROAMPHETAMINE SULFATE AND AMPHETAMINE SULFATE 7.5; 7.5; 7.5; 7.5 MG/1; MG/1; MG/1; MG/1
30 CAPSULE, EXTENDED RELEASE ORAL DAILY
Qty: 30 CAPSULE | Refills: 0 | Status: SHIPPED | OUTPATIENT
Start: 2024-01-04 | End: 2024-02-03

## 2024-01-04 ASSESSMENT — ANXIETY QUESTIONNAIRES
IF YOU CHECKED OFF ANY PROBLEMS ON THIS QUESTIONNAIRE, HOW DIFFICULT HAVE THESE PROBLEMS MADE IT FOR YOU TO DO YOUR WORK, TAKE CARE OF THINGS AT HOME, OR GET ALONG WITH OTHER PEOPLE: SOMEWHAT DIFFICULT
3. WORRYING TOO MUCH ABOUT DIFFERENT THINGS: 2
GAD7 TOTAL SCORE: 7
4. TROUBLE RELAXING: 1
6. BECOMING EASILY ANNOYED OR IRRITABLE: 0
5. BEING SO RESTLESS THAT IT IS HARD TO SIT STILL: 0
7. FEELING AFRAID AS IF SOMETHING AWFUL MIGHT HAPPEN: 0
2. NOT BEING ABLE TO STOP OR CONTROL WORRYING: 2
1. FEELING NERVOUS, ANXIOUS, OR ON EDGE: 2

## 2024-01-04 ASSESSMENT — PATIENT HEALTH QUESTIONNAIRE - PHQ9
SUM OF ALL RESPONSES TO PHQ9 QUESTIONS 1 & 2: 1
10. IF YOU CHECKED OFF ANY PROBLEMS, HOW DIFFICULT HAVE THESE PROBLEMS MADE IT FOR YOU TO DO YOUR WORK, TAKE CARE OF THINGS AT HOME, OR GET ALONG WITH OTHER PEOPLE: 1
8. MOVING OR SPEAKING SO SLOWLY THAT OTHER PEOPLE COULD HAVE NOTICED. OR THE OPPOSITE, BEING SO FIGETY OR RESTLESS THAT YOU HAVE BEEN MOVING AROUND A LOT MORE THAN USUAL: 0
SUM OF ALL RESPONSES TO PHQ QUESTIONS 1-9: 4
7. TROUBLE CONCENTRATING ON THINGS, SUCH AS READING THE NEWSPAPER OR WATCHING TELEVISION: 1
3. TROUBLE FALLING OR STAYING ASLEEP: 0
SUM OF ALL RESPONSES TO PHQ QUESTIONS 1-9: 4
2. FEELING DOWN, DEPRESSED OR HOPELESS: 0
SUM OF ALL RESPONSES TO PHQ QUESTIONS 1-9: 4
1. LITTLE INTEREST OR PLEASURE IN DOING THINGS: 1
5. POOR APPETITE OR OVEREATING: 0
4. FEELING TIRED OR HAVING LITTLE ENERGY: 2
6. FEELING BAD ABOUT YOURSELF - OR THAT YOU ARE A FAILURE OR HAVE LET YOURSELF OR YOUR FAMILY DOWN: 0
9. THOUGHTS THAT YOU WOULD BE BETTER OFF DEAD, OR OF HURTING YOURSELF: 0
SUM OF ALL RESPONSES TO PHQ QUESTIONS 1-9: 4

## 2024-01-04 ASSESSMENT — ENCOUNTER SYMPTOMS
EYES NEGATIVE: 1
GASTROINTESTINAL NEGATIVE: 1
ALLERGIC/IMMUNOLOGIC NEGATIVE: 1
RESPIRATORY NEGATIVE: 1

## 2024-01-15 DIAGNOSIS — N92.0 MENORRHAGIA WITH REGULAR CYCLE: Primary | ICD-10-CM

## 2024-01-15 RX ORDER — NORETHINDRONE ACETATE AND ETHINYL ESTRADIOL AND FERROUS FUMARATE 1MG-20(21)
KIT ORAL
Qty: 3 PACKET | Refills: 0 | Status: SHIPPED | OUTPATIENT
Start: 2024-01-15

## 2024-03-27 NOTE — PROGRESS NOTES
4/1/2024     4:05 PM 1/4/2024     4:34 PM   PHQ-9 Questionaire   Little interest or pleasure in doing things 1 1   Feeling down, depressed, or hopeless 0 0   Trouble falling or staying asleep, or sleeping too much 1 0   Feeling tired or having little energy 3 2   Poor appetite or overeating 0 0   Feeling bad about yourself - or that you are a failure or have let yourself or your family down 0 0   Trouble concentrating on things, such as reading the newspaper or watching television 1 1   Moving or speaking so slowly that other people could have noticed. Or the opposite - being so fidgety or restless that you have been moving around a lot more than usual 0 0   Thoughts that you would be better off dead, or of hurting yourself in some way 0 0   PHQ-9 Total Score 6 4   If you checked off any problems, how difficult have these problems made it for you to do your work, take care of things at home, or get along with other people? 2 1         4/1/2024     4:00 PM 1/4/2024     4:00 PM   JESUS ALBERTO-7 SCREENING   Feeling nervous, anxious, or on edge Several days More than half the days   Not being able to stop or control worrying Several days More than half the days   Worrying too much about different things Several days More than half the days   Trouble relaxing Several days Several days   Being so restless that it is hard to sit still Not at all Not at all   Becoming easily annoyed or irritable Not at all Not at all   Feeling afraid as if something awful might happen Not at all Not at all   JESUS ALBERTO-7 Total Score 4 7   How difficult have these problems made it for you to do your work, take care of things at home, or get along with other people?  Somewhat difficult        Labs:     Orders Only on 10/17/2023   Component Date Value Ref Range Status    Androstenedione 10/17/2023 0.297  0.260 - 2.140 ng/mL Final    Comment: INTERPRETIVE INFORMATION: Androstenedione, Females 18 years and older  Post-menopausal: 0.13-0.82 ng/mL  REFERENCE

## 2024-04-01 ENCOUNTER — OFFICE VISIT (OUTPATIENT)
Dept: PSYCHIATRY | Age: 29
End: 2024-04-01
Payer: COMMERCIAL

## 2024-04-01 VITALS
WEIGHT: 121 LBS | BODY MASS INDEX: 19.44 KG/M2 | OXYGEN SATURATION: 98 % | DIASTOLIC BLOOD PRESSURE: 72 MMHG | HEIGHT: 66 IN | HEART RATE: 109 BPM | SYSTOLIC BLOOD PRESSURE: 118 MMHG

## 2024-04-01 DIAGNOSIS — F90.0 ADHD, PREDOMINANTLY INATTENTIVE TYPE: ICD-10-CM

## 2024-04-01 DIAGNOSIS — F33.8 SEASONAL AFFECTIVE DISORDER (HCC): Primary | ICD-10-CM

## 2024-04-01 PROCEDURE — 99214 OFFICE O/P EST MOD 30 MIN: CPT | Performed by: STUDENT IN AN ORGANIZED HEALTH CARE EDUCATION/TRAINING PROGRAM

## 2024-04-01 RX ORDER — DEXTROAMPHETAMINE SACCHARATE, AMPHETAMINE ASPARTATE MONOHYDRATE, DEXTROAMPHETAMINE SULFATE AND AMPHETAMINE SULFATE 7.5; 7.5; 7.5; 7.5 MG/1; MG/1; MG/1; MG/1
30 CAPSULE, EXTENDED RELEASE ORAL DAILY
Qty: 30 CAPSULE | Refills: 0 | Status: SHIPPED | OUTPATIENT
Start: 2024-04-12 | End: 2024-05-12

## 2024-04-01 ASSESSMENT — ANXIETY QUESTIONNAIRES
4. TROUBLE RELAXING: SEVERAL DAYS
7. FEELING AFRAID AS IF SOMETHING AWFUL MIGHT HAPPEN: NOT AT ALL
5. BEING SO RESTLESS THAT IT IS HARD TO SIT STILL: NOT AT ALL
6. BECOMING EASILY ANNOYED OR IRRITABLE: NOT AT ALL
3. WORRYING TOO MUCH ABOUT DIFFERENT THINGS: SEVERAL DAYS
GAD7 TOTAL SCORE: 4
1. FEELING NERVOUS, ANXIOUS, OR ON EDGE: SEVERAL DAYS
2. NOT BEING ABLE TO STOP OR CONTROL WORRYING: SEVERAL DAYS

## 2024-04-01 ASSESSMENT — PATIENT HEALTH QUESTIONNAIRE - PHQ9
4. FEELING TIRED OR HAVING LITTLE ENERGY: NEARLY EVERY DAY
5. POOR APPETITE OR OVEREATING: NOT AT ALL
6. FEELING BAD ABOUT YOURSELF - OR THAT YOU ARE A FAILURE OR HAVE LET YOURSELF OR YOUR FAMILY DOWN: NOT AT ALL
SUM OF ALL RESPONSES TO PHQ QUESTIONS 1-9: 6
10. IF YOU CHECKED OFF ANY PROBLEMS, HOW DIFFICULT HAVE THESE PROBLEMS MADE IT FOR YOU TO DO YOUR WORK, TAKE CARE OF THINGS AT HOME, OR GET ALONG WITH OTHER PEOPLE: VERY DIFFICULT
SUM OF ALL RESPONSES TO PHQ9 QUESTIONS 1 & 2: 1
8. MOVING OR SPEAKING SO SLOWLY THAT OTHER PEOPLE COULD HAVE NOTICED. OR THE OPPOSITE, BEING SO FIGETY OR RESTLESS THAT YOU HAVE BEEN MOVING AROUND A LOT MORE THAN USUAL: NOT AT ALL
2. FEELING DOWN, DEPRESSED OR HOPELESS: NOT AT ALL
SUM OF ALL RESPONSES TO PHQ QUESTIONS 1-9: 6
7. TROUBLE CONCENTRATING ON THINGS, SUCH AS READING THE NEWSPAPER OR WATCHING TELEVISION: SEVERAL DAYS
SUM OF ALL RESPONSES TO PHQ QUESTIONS 1-9: 6
1. LITTLE INTEREST OR PLEASURE IN DOING THINGS: SEVERAL DAYS
9. THOUGHTS THAT YOU WOULD BE BETTER OFF DEAD, OR OF HURTING YOURSELF: NOT AT ALL
SUM OF ALL RESPONSES TO PHQ QUESTIONS 1-9: 6
3. TROUBLE FALLING OR STAYING ASLEEP: SEVERAL DAYS

## 2024-04-01 ASSESSMENT — ENCOUNTER SYMPTOMS
RESPIRATORY NEGATIVE: 1
GASTROINTESTINAL NEGATIVE: 1
ALLERGIC/IMMUNOLOGIC NEGATIVE: 1
EYES NEGATIVE: 1

## 2024-05-14 DIAGNOSIS — F90.0 ADHD, PREDOMINANTLY INATTENTIVE TYPE: ICD-10-CM

## 2024-05-15 RX ORDER — DEXTROAMPHETAMINE SACCHARATE, AMPHETAMINE ASPARTATE MONOHYDRATE, DEXTROAMPHETAMINE SULFATE AND AMPHETAMINE SULFATE 7.5; 7.5; 7.5; 7.5 MG/1; MG/1; MG/1; MG/1
30 CAPSULE, EXTENDED RELEASE ORAL DAILY
Qty: 30 CAPSULE | Refills: 0 | Status: SHIPPED | OUTPATIENT
Start: 2024-05-15 | End: 2024-06-17 | Stop reason: SDUPTHER

## 2024-06-17 DIAGNOSIS — F90.0 ADHD, PREDOMINANTLY INATTENTIVE TYPE: ICD-10-CM

## 2024-06-17 RX ORDER — DEXTROAMPHETAMINE SACCHARATE, AMPHETAMINE ASPARTATE MONOHYDRATE, DEXTROAMPHETAMINE SULFATE AND AMPHETAMINE SULFATE 7.5; 7.5; 7.5; 7.5 MG/1; MG/1; MG/1; MG/1
30 CAPSULE, EXTENDED RELEASE ORAL DAILY
Qty: 30 CAPSULE | Refills: 0 | Status: SHIPPED | OUTPATIENT
Start: 2024-08-12 | End: 2024-09-11

## 2024-06-17 RX ORDER — DEXTROAMPHETAMINE SACCHARATE, AMPHETAMINE ASPARTATE MONOHYDRATE, DEXTROAMPHETAMINE SULFATE AND AMPHETAMINE SULFATE 7.5; 7.5; 7.5; 7.5 MG/1; MG/1; MG/1; MG/1
30 CAPSULE, EXTENDED RELEASE ORAL DAILY
Qty: 30 CAPSULE | Refills: 0 | Status: SHIPPED | OUTPATIENT
Start: 2024-06-17 | End: 2024-07-17

## 2024-06-17 RX ORDER — DEXTROAMPHETAMINE SACCHARATE, AMPHETAMINE ASPARTATE MONOHYDRATE, DEXTROAMPHETAMINE SULFATE AND AMPHETAMINE SULFATE 7.5; 7.5; 7.5; 7.5 MG/1; MG/1; MG/1; MG/1
30 CAPSULE, EXTENDED RELEASE ORAL DAILY
Qty: 30 CAPSULE | Refills: 0 | Status: SHIPPED | OUTPATIENT
Start: 2024-07-15 | End: 2024-08-14